# Patient Record
Sex: MALE | Race: BLACK OR AFRICAN AMERICAN
[De-identification: names, ages, dates, MRNs, and addresses within clinical notes are randomized per-mention and may not be internally consistent; named-entity substitution may affect disease eponyms.]

---

## 2019-02-28 ENCOUNTER — HOSPITAL ENCOUNTER (INPATIENT)
Dept: HOSPITAL 35 - ER | Age: 84
LOS: 4 days | Discharge: HOME | DRG: 963 | End: 2019-03-04
Attending: FAMILY MEDICINE | Admitting: FAMILY MEDICINE
Payer: COMMERCIAL

## 2019-02-28 VITALS — SYSTOLIC BLOOD PRESSURE: 117 MMHG | DIASTOLIC BLOOD PRESSURE: 65 MMHG

## 2019-02-28 VITALS — SYSTOLIC BLOOD PRESSURE: 126 MMHG | DIASTOLIC BLOOD PRESSURE: 60 MMHG

## 2019-02-28 VITALS — DIASTOLIC BLOOD PRESSURE: 72 MMHG | SYSTOLIC BLOOD PRESSURE: 135 MMHG

## 2019-02-28 VITALS — SYSTOLIC BLOOD PRESSURE: 120 MMHG | DIASTOLIC BLOOD PRESSURE: 65 MMHG

## 2019-02-28 VITALS — SYSTOLIC BLOOD PRESSURE: 125 MMHG | DIASTOLIC BLOOD PRESSURE: 71 MMHG

## 2019-02-28 VITALS — SYSTOLIC BLOOD PRESSURE: 125 MMHG | DIASTOLIC BLOOD PRESSURE: 81 MMHG

## 2019-02-28 VITALS — DIASTOLIC BLOOD PRESSURE: 66 MMHG | SYSTOLIC BLOOD PRESSURE: 135 MMHG

## 2019-02-28 VITALS — DIASTOLIC BLOOD PRESSURE: 64 MMHG | SYSTOLIC BLOOD PRESSURE: 128 MMHG

## 2019-02-28 VITALS — SYSTOLIC BLOOD PRESSURE: 123 MMHG | DIASTOLIC BLOOD PRESSURE: 61 MMHG

## 2019-02-28 VITALS — DIASTOLIC BLOOD PRESSURE: 69 MMHG | SYSTOLIC BLOOD PRESSURE: 144 MMHG

## 2019-02-28 VITALS — DIASTOLIC BLOOD PRESSURE: 63 MMHG | SYSTOLIC BLOOD PRESSURE: 121 MMHG

## 2019-02-28 VITALS — SYSTOLIC BLOOD PRESSURE: 135 MMHG | DIASTOLIC BLOOD PRESSURE: 64 MMHG

## 2019-02-28 VITALS — SYSTOLIC BLOOD PRESSURE: 116 MMHG | DIASTOLIC BLOOD PRESSURE: 63 MMHG

## 2019-02-28 VITALS — SYSTOLIC BLOOD PRESSURE: 128 MMHG | DIASTOLIC BLOOD PRESSURE: 69 MMHG

## 2019-02-28 VITALS — SYSTOLIC BLOOD PRESSURE: 126 MMHG | DIASTOLIC BLOOD PRESSURE: 65 MMHG

## 2019-02-28 VITALS — DIASTOLIC BLOOD PRESSURE: 80 MMHG | SYSTOLIC BLOOD PRESSURE: 116 MMHG

## 2019-02-28 VITALS — SYSTOLIC BLOOD PRESSURE: 129 MMHG | DIASTOLIC BLOOD PRESSURE: 55 MMHG

## 2019-02-28 VITALS — SYSTOLIC BLOOD PRESSURE: 139 MMHG | DIASTOLIC BLOOD PRESSURE: 73 MMHG

## 2019-02-28 VITALS — DIASTOLIC BLOOD PRESSURE: 71 MMHG | SYSTOLIC BLOOD PRESSURE: 139 MMHG

## 2019-02-28 VITALS — SYSTOLIC BLOOD PRESSURE: 135 MMHG | DIASTOLIC BLOOD PRESSURE: 72 MMHG

## 2019-02-28 VITALS — DIASTOLIC BLOOD PRESSURE: 69 MMHG | SYSTOLIC BLOOD PRESSURE: 123 MMHG

## 2019-02-28 VITALS — DIASTOLIC BLOOD PRESSURE: 72 MMHG | SYSTOLIC BLOOD PRESSURE: 145 MMHG

## 2019-02-28 VITALS — DIASTOLIC BLOOD PRESSURE: 71 MMHG | SYSTOLIC BLOOD PRESSURE: 141 MMHG

## 2019-02-28 VITALS — HEIGHT: 70 IN | BODY MASS INDEX: 29.06 KG/M2 | WEIGHT: 203 LBS

## 2019-02-28 VITALS — DIASTOLIC BLOOD PRESSURE: 69 MMHG | SYSTOLIC BLOOD PRESSURE: 143 MMHG

## 2019-02-28 VITALS — SYSTOLIC BLOOD PRESSURE: 138 MMHG | DIASTOLIC BLOOD PRESSURE: 71 MMHG

## 2019-02-28 VITALS — SYSTOLIC BLOOD PRESSURE: 132 MMHG | DIASTOLIC BLOOD PRESSURE: 65 MMHG

## 2019-02-28 DIAGNOSIS — K65.2: ICD-10-CM

## 2019-02-28 DIAGNOSIS — S37.012A: Primary | ICD-10-CM
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
## 2019-02-28 DIAGNOSIS — Z86.711: ICD-10-CM

## 2019-02-28 DIAGNOSIS — M10.9: ICD-10-CM

## 2019-02-28 DIAGNOSIS — Z91.012: ICD-10-CM

## 2019-02-28 DIAGNOSIS — M19.90: ICD-10-CM

## 2019-02-28 DIAGNOSIS — N17.9: ICD-10-CM

## 2019-02-28 DIAGNOSIS — N40.0: ICD-10-CM

## 2019-02-28 DIAGNOSIS — E78.00: ICD-10-CM

## 2019-02-28 DIAGNOSIS — Z87.81: ICD-10-CM

## 2019-02-28 DIAGNOSIS — N18.9: ICD-10-CM
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
## 2019-02-28 DIAGNOSIS — D68.9: ICD-10-CM

## 2019-02-28 DIAGNOSIS — I48.0: ICD-10-CM

## 2019-02-28 DIAGNOSIS — K21.9: ICD-10-CM

## 2019-02-28 DIAGNOSIS — E03.9: ICD-10-CM

## 2019-02-28 DIAGNOSIS — D47.2: ICD-10-CM

## 2019-02-28 DIAGNOSIS — Z79.899: ICD-10-CM

## 2019-02-28 DIAGNOSIS — Z87.828: ICD-10-CM

## 2019-02-28 DIAGNOSIS — Z91.041: ICD-10-CM

## 2019-02-28 DIAGNOSIS — Z83.3: ICD-10-CM

## 2019-02-28 DIAGNOSIS — R18.8: ICD-10-CM

## 2019-02-28 DIAGNOSIS — Z82.49: ICD-10-CM

## 2019-02-28 DIAGNOSIS — S37.011A: ICD-10-CM
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
## 2019-02-28 DIAGNOSIS — S36.898A: ICD-10-CM

## 2019-02-28 DIAGNOSIS — E66.9: ICD-10-CM

## 2019-02-28 DIAGNOSIS — Z79.01: ICD-10-CM

## 2019-02-28 DIAGNOSIS — D64.9: ICD-10-CM

## 2019-02-28 DIAGNOSIS — K80.20: ICD-10-CM

## 2019-02-28 DIAGNOSIS — E11.22: ICD-10-CM
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
## 2019-02-28 DIAGNOSIS — I12.9: ICD-10-CM

## 2019-02-28 DIAGNOSIS — Z79.84: ICD-10-CM

## 2019-02-28 DIAGNOSIS — N28.1: ICD-10-CM

## 2019-02-28 LAB
ALBUMIN SERPL-MCNC: 2.3 G/DL (ref 3.4–5)
ALT SERPL-CCNC: 64 U/L (ref 30–65)
ANION GAP SERPL CALC-SCNC: 8 MMOL/L (ref 7–16)
ANISOCYTOSIS BLD QL SMEAR: (no result)
APTT BLD: 64.7 SECONDS (ref 24.5–32.8)
AST SERPL-CCNC: 337 U/L (ref 15–37)
BACTERIA-REFLEX: (no result) /HPF
BASOPHILS NFR BLD AUTO: 1 % (ref 0–2)
BILIRUB SERPL-MCNC: 0.2 MG/DL
BILIRUB UR-MCNC: NEGATIVE MG/DL
BUN SERPL-MCNC: 31 MG/DL (ref 7–18)
CALCIUM SERPL-MCNC: 8.8 MG/DL (ref 8.5–10.1)
CHLORIDE SERPL-SCNC: 107 MMOL/L (ref 98–107)
CO2 SERPL-SCNC: 22 MMOL/L (ref 21–32)
COLOR UR: YELLOW
CREAT SERPL-MCNC: 2.5 MG/DL (ref 0.7–1.3)
EOSINOPHIL NFR BLD: 3 % (ref 0–3)
ERYTHROCYTE [DISTWIDTH] IN BLOOD BY AUTOMATED COUNT: 16.2 % (ref 10.5–14.5)
GLUCOSE SERPL-MCNC: 70 MG/DL (ref 74–106)
GRANULOCYTES NFR BLD MANUAL: 52 % (ref 36–66)
HCT VFR BLD CALC: 29.1 % (ref 42–52)
HGB BLD-MCNC: 9.7 GM/DL (ref 14–18)
INR PPP: 14.9
KETONES UR STRIP-MCNC: NEGATIVE MG/DL
LYMPHOCYTES NFR BLD AUTO: 29 % (ref 24–44)
MCH RBC QN AUTO: 27.9 PG (ref 26–34)
MCHC RBC AUTO-ENTMCNC: 33.4 G/DL (ref 28–37)
MCV RBC: 83.6 FL (ref 80–100)
MONOCYTES NFR BLD: 15 % (ref 1–8)
NEUTROPHILS # BLD: 2.8 THOU/UL (ref 1.4–8.2)
PLATELET # BLD EST: NORMAL 10*3/UL
PLATELET # BLD: 301 THOU/UL (ref 150–400)
POTASSIUM SERPL-SCNC: 4.3 MMOL/L (ref 3.5–5.1)
PROT SERPL-MCNC: 8.7 G/DL (ref 6.4–8.2)
PROTHROMBIN TIME: 151.9 SECONDS (ref 9.3–11.4)
RBC # BLD AUTO: 3.48 MIL/UL (ref 4.5–6)
RBC # UR STRIP: (no result) /UL
RBC #/AREA URNS HPF: (no result) /HPF (ref 0–2)
SODIUM SERPL-SCNC: 137 MMOL/L (ref 136–145)
SP GR UR STRIP: >= 1.03 (ref 1–1.03)
SQUAMOUS: (no result) /LPF (ref 0–3)
TROPONIN I SERPL-MCNC: <0.06 NG/ML (ref ?–0.06)
URINE CLARITY: CLEAR
URINE GLUCOSE-RANDOM*: NEGATIVE
URINE LEUKOCYTES-REFLEX: NEGATIVE
URINE NITRITE-REFLEX: NEGATIVE
URINE PROTEIN (DIPSTICK): (no result)
UROBILINOGEN UR STRIP-ACNC: 0.2 E.U./DL (ref 0.2–1)
WBC # BLD AUTO: 5.3 THOU/UL (ref 4–11)

## 2019-02-28 PROCEDURE — 10078: CPT

## 2019-02-28 PROCEDURE — 30233K1 TRANSFUSION OF NONAUTOLOGOUS FROZEN PLASMA INTO PERIPHERAL VEIN, PERCUTANEOUS APPROACH: ICD-10-PCS | Performed by: FAMILY MEDICINE

## 2019-02-28 PROCEDURE — 10045: CPT

## 2019-02-28 NOTE — NUR
ADMITTED TO ICU AT 1805, ALERT AND ORIENTED AND VITALS STABLE. ADMISSION
HISTORY AND ASSESSMENT COMPLETED. FFP TRANSFUSION INITIATED. BLOOD GLUCOSE
NOTED TO BE LOW, JUICE AND CRACKERS PROVIDED AND MD NOTIFIED. DIET ORDERS AND
HYPOGLYCEMIA PROTOCOL STARTED. BLOOD SUGAR LOW AFTER RE-CHECK, D50
ADMINISTERED AND SANDWICH PROVIDED. DIEABETIC MEDS HELD. WILL CONTINUE TO
MONITOR. REPORTED OFF TO NIGHT SHIFT RANDALL SILVER

## 2019-03-01 VITALS — SYSTOLIC BLOOD PRESSURE: 117 MMHG | DIASTOLIC BLOOD PRESSURE: 57 MMHG

## 2019-03-01 VITALS — SYSTOLIC BLOOD PRESSURE: 127 MMHG | DIASTOLIC BLOOD PRESSURE: 61 MMHG

## 2019-03-01 VITALS — SYSTOLIC BLOOD PRESSURE: 128 MMHG | DIASTOLIC BLOOD PRESSURE: 61 MMHG

## 2019-03-01 VITALS — SYSTOLIC BLOOD PRESSURE: 133 MMHG | DIASTOLIC BLOOD PRESSURE: 66 MMHG

## 2019-03-01 VITALS — DIASTOLIC BLOOD PRESSURE: 62 MMHG | SYSTOLIC BLOOD PRESSURE: 124 MMHG

## 2019-03-01 VITALS — DIASTOLIC BLOOD PRESSURE: 59 MMHG | SYSTOLIC BLOOD PRESSURE: 114 MMHG

## 2019-03-01 VITALS — SYSTOLIC BLOOD PRESSURE: 124 MMHG | DIASTOLIC BLOOD PRESSURE: 65 MMHG

## 2019-03-01 VITALS — DIASTOLIC BLOOD PRESSURE: 66 MMHG | SYSTOLIC BLOOD PRESSURE: 126 MMHG

## 2019-03-01 VITALS — DIASTOLIC BLOOD PRESSURE: 71 MMHG | SYSTOLIC BLOOD PRESSURE: 126 MMHG

## 2019-03-01 VITALS — DIASTOLIC BLOOD PRESSURE: 65 MMHG | SYSTOLIC BLOOD PRESSURE: 126 MMHG

## 2019-03-01 VITALS — DIASTOLIC BLOOD PRESSURE: 60 MMHG | SYSTOLIC BLOOD PRESSURE: 113 MMHG

## 2019-03-01 VITALS — SYSTOLIC BLOOD PRESSURE: 128 MMHG | DIASTOLIC BLOOD PRESSURE: 67 MMHG

## 2019-03-01 VITALS — SYSTOLIC BLOOD PRESSURE: 111 MMHG | DIASTOLIC BLOOD PRESSURE: 61 MMHG

## 2019-03-01 VITALS — SYSTOLIC BLOOD PRESSURE: 139 MMHG | DIASTOLIC BLOOD PRESSURE: 59 MMHG

## 2019-03-01 VITALS — DIASTOLIC BLOOD PRESSURE: 70 MMHG | SYSTOLIC BLOOD PRESSURE: 141 MMHG

## 2019-03-01 VITALS — DIASTOLIC BLOOD PRESSURE: 69 MMHG | SYSTOLIC BLOOD PRESSURE: 134 MMHG

## 2019-03-01 VITALS — DIASTOLIC BLOOD PRESSURE: 38 MMHG | SYSTOLIC BLOOD PRESSURE: 105 MMHG

## 2019-03-01 VITALS — DIASTOLIC BLOOD PRESSURE: 65 MMHG | SYSTOLIC BLOOD PRESSURE: 134 MMHG

## 2019-03-01 VITALS — SYSTOLIC BLOOD PRESSURE: 117 MMHG | DIASTOLIC BLOOD PRESSURE: 62 MMHG

## 2019-03-01 VITALS — DIASTOLIC BLOOD PRESSURE: 65 MMHG | SYSTOLIC BLOOD PRESSURE: 137 MMHG

## 2019-03-01 VITALS — DIASTOLIC BLOOD PRESSURE: 58 MMHG | SYSTOLIC BLOOD PRESSURE: 108 MMHG

## 2019-03-01 VITALS — SYSTOLIC BLOOD PRESSURE: 133 MMHG | DIASTOLIC BLOOD PRESSURE: 64 MMHG

## 2019-03-01 VITALS — DIASTOLIC BLOOD PRESSURE: 43 MMHG | SYSTOLIC BLOOD PRESSURE: 109 MMHG

## 2019-03-01 VITALS — DIASTOLIC BLOOD PRESSURE: 76 MMHG | SYSTOLIC BLOOD PRESSURE: 133 MMHG

## 2019-03-01 VITALS — SYSTOLIC BLOOD PRESSURE: 134 MMHG | DIASTOLIC BLOOD PRESSURE: 67 MMHG

## 2019-03-01 VITALS — DIASTOLIC BLOOD PRESSURE: 67 MMHG | SYSTOLIC BLOOD PRESSURE: 132 MMHG

## 2019-03-01 VITALS — SYSTOLIC BLOOD PRESSURE: 132 MMHG | DIASTOLIC BLOOD PRESSURE: 56 MMHG

## 2019-03-01 VITALS — DIASTOLIC BLOOD PRESSURE: 69 MMHG | SYSTOLIC BLOOD PRESSURE: 139 MMHG

## 2019-03-01 VITALS — SYSTOLIC BLOOD PRESSURE: 136 MMHG | DIASTOLIC BLOOD PRESSURE: 69 MMHG

## 2019-03-01 VITALS — SYSTOLIC BLOOD PRESSURE: 119 MMHG | DIASTOLIC BLOOD PRESSURE: 58 MMHG

## 2019-03-01 VITALS — SYSTOLIC BLOOD PRESSURE: 121 MMHG | DIASTOLIC BLOOD PRESSURE: 63 MMHG

## 2019-03-01 VITALS — DIASTOLIC BLOOD PRESSURE: 70 MMHG | SYSTOLIC BLOOD PRESSURE: 124 MMHG

## 2019-03-01 VITALS — DIASTOLIC BLOOD PRESSURE: 66 MMHG | SYSTOLIC BLOOD PRESSURE: 124 MMHG

## 2019-03-01 VITALS — DIASTOLIC BLOOD PRESSURE: 58 MMHG | SYSTOLIC BLOOD PRESSURE: 122 MMHG

## 2019-03-01 VITALS — DIASTOLIC BLOOD PRESSURE: 65 MMHG | SYSTOLIC BLOOD PRESSURE: 132 MMHG

## 2019-03-01 VITALS — SYSTOLIC BLOOD PRESSURE: 146 MMHG | DIASTOLIC BLOOD PRESSURE: 79 MMHG

## 2019-03-01 VITALS — SYSTOLIC BLOOD PRESSURE: 119 MMHG | DIASTOLIC BLOOD PRESSURE: 63 MMHG

## 2019-03-01 VITALS — SYSTOLIC BLOOD PRESSURE: 117 MMHG | DIASTOLIC BLOOD PRESSURE: 93 MMHG

## 2019-03-01 VITALS — DIASTOLIC BLOOD PRESSURE: 66 MMHG | SYSTOLIC BLOOD PRESSURE: 140 MMHG

## 2019-03-01 VITALS — DIASTOLIC BLOOD PRESSURE: 76 MMHG | SYSTOLIC BLOOD PRESSURE: 128 MMHG

## 2019-03-01 VITALS — DIASTOLIC BLOOD PRESSURE: 60 MMHG | SYSTOLIC BLOOD PRESSURE: 120 MMHG

## 2019-03-01 VITALS — DIASTOLIC BLOOD PRESSURE: 57 MMHG | SYSTOLIC BLOOD PRESSURE: 124 MMHG

## 2019-03-01 VITALS — SYSTOLIC BLOOD PRESSURE: 101 MMHG | DIASTOLIC BLOOD PRESSURE: 62 MMHG

## 2019-03-01 VITALS — DIASTOLIC BLOOD PRESSURE: 62 MMHG | SYSTOLIC BLOOD PRESSURE: 115 MMHG

## 2019-03-01 VITALS — DIASTOLIC BLOOD PRESSURE: 69 MMHG | SYSTOLIC BLOOD PRESSURE: 133 MMHG

## 2019-03-01 VITALS — DIASTOLIC BLOOD PRESSURE: 63 MMHG | SYSTOLIC BLOOD PRESSURE: 131 MMHG

## 2019-03-01 VITALS — DIASTOLIC BLOOD PRESSURE: 57 MMHG | SYSTOLIC BLOOD PRESSURE: 117 MMHG

## 2019-03-01 VITALS — SYSTOLIC BLOOD PRESSURE: 108 MMHG | DIASTOLIC BLOOD PRESSURE: 54 MMHG

## 2019-03-01 VITALS — SYSTOLIC BLOOD PRESSURE: 103 MMHG | DIASTOLIC BLOOD PRESSURE: 59 MMHG

## 2019-03-01 VITALS — DIASTOLIC BLOOD PRESSURE: 62 MMHG | SYSTOLIC BLOOD PRESSURE: 121 MMHG

## 2019-03-01 VITALS — DIASTOLIC BLOOD PRESSURE: 71 MMHG | SYSTOLIC BLOOD PRESSURE: 142 MMHG

## 2019-03-01 VITALS — DIASTOLIC BLOOD PRESSURE: 61 MMHG | SYSTOLIC BLOOD PRESSURE: 123 MMHG

## 2019-03-01 VITALS — SYSTOLIC BLOOD PRESSURE: 129 MMHG | DIASTOLIC BLOOD PRESSURE: 62 MMHG

## 2019-03-01 VITALS — DIASTOLIC BLOOD PRESSURE: 67 MMHG | SYSTOLIC BLOOD PRESSURE: 130 MMHG

## 2019-03-01 VITALS — SYSTOLIC BLOOD PRESSURE: 144 MMHG | DIASTOLIC BLOOD PRESSURE: 70 MMHG

## 2019-03-01 VITALS — SYSTOLIC BLOOD PRESSURE: 126 MMHG | DIASTOLIC BLOOD PRESSURE: 62 MMHG

## 2019-03-01 VITALS — SYSTOLIC BLOOD PRESSURE: 119 MMHG | DIASTOLIC BLOOD PRESSURE: 62 MMHG

## 2019-03-01 VITALS — SYSTOLIC BLOOD PRESSURE: 133 MMHG | DIASTOLIC BLOOD PRESSURE: 68 MMHG

## 2019-03-01 VITALS — DIASTOLIC BLOOD PRESSURE: 63 MMHG | SYSTOLIC BLOOD PRESSURE: 111 MMHG

## 2019-03-01 VITALS — DIASTOLIC BLOOD PRESSURE: 58 MMHG | SYSTOLIC BLOOD PRESSURE: 111 MMHG

## 2019-03-01 VITALS — SYSTOLIC BLOOD PRESSURE: 114 MMHG | DIASTOLIC BLOOD PRESSURE: 52 MMHG

## 2019-03-01 VITALS — DIASTOLIC BLOOD PRESSURE: 63 MMHG | SYSTOLIC BLOOD PRESSURE: 124 MMHG

## 2019-03-01 VITALS — SYSTOLIC BLOOD PRESSURE: 112 MMHG | DIASTOLIC BLOOD PRESSURE: 74 MMHG

## 2019-03-01 VITALS — DIASTOLIC BLOOD PRESSURE: 68 MMHG | SYSTOLIC BLOOD PRESSURE: 140 MMHG

## 2019-03-01 VITALS — SYSTOLIC BLOOD PRESSURE: 116 MMHG | DIASTOLIC BLOOD PRESSURE: 58 MMHG

## 2019-03-01 VITALS — SYSTOLIC BLOOD PRESSURE: 123 MMHG | DIASTOLIC BLOOD PRESSURE: 61 MMHG

## 2019-03-01 VITALS — SYSTOLIC BLOOD PRESSURE: 135 MMHG | DIASTOLIC BLOOD PRESSURE: 72 MMHG

## 2019-03-01 VITALS — SYSTOLIC BLOOD PRESSURE: 139 MMHG | DIASTOLIC BLOOD PRESSURE: 69 MMHG

## 2019-03-01 VITALS — SYSTOLIC BLOOD PRESSURE: 131 MMHG | DIASTOLIC BLOOD PRESSURE: 77 MMHG

## 2019-03-01 VITALS — SYSTOLIC BLOOD PRESSURE: 125 MMHG | DIASTOLIC BLOOD PRESSURE: 61 MMHG

## 2019-03-01 VITALS — SYSTOLIC BLOOD PRESSURE: 135 MMHG | DIASTOLIC BLOOD PRESSURE: 63 MMHG

## 2019-03-01 VITALS — DIASTOLIC BLOOD PRESSURE: 72 MMHG | SYSTOLIC BLOOD PRESSURE: 132 MMHG

## 2019-03-01 VITALS — SYSTOLIC BLOOD PRESSURE: 116 MMHG | DIASTOLIC BLOOD PRESSURE: 64 MMHG

## 2019-03-01 VITALS — SYSTOLIC BLOOD PRESSURE: 122 MMHG | DIASTOLIC BLOOD PRESSURE: 60 MMHG

## 2019-03-01 VITALS — SYSTOLIC BLOOD PRESSURE: 129 MMHG | DIASTOLIC BLOOD PRESSURE: 66 MMHG

## 2019-03-01 VITALS — DIASTOLIC BLOOD PRESSURE: 72 MMHG | SYSTOLIC BLOOD PRESSURE: 127 MMHG

## 2019-03-01 VITALS — SYSTOLIC BLOOD PRESSURE: 121 MMHG | DIASTOLIC BLOOD PRESSURE: 59 MMHG

## 2019-03-01 VITALS — DIASTOLIC BLOOD PRESSURE: 66 MMHG | SYSTOLIC BLOOD PRESSURE: 127 MMHG

## 2019-03-01 VITALS — SYSTOLIC BLOOD PRESSURE: 128 MMHG | DIASTOLIC BLOOD PRESSURE: 64 MMHG

## 2019-03-01 VITALS — SYSTOLIC BLOOD PRESSURE: 141 MMHG | DIASTOLIC BLOOD PRESSURE: 64 MMHG

## 2019-03-01 VITALS — DIASTOLIC BLOOD PRESSURE: 60 MMHG | SYSTOLIC BLOOD PRESSURE: 121 MMHG

## 2019-03-01 LAB
ALBUMIN SERPL-MCNC: 2.3 G/DL (ref 3.4–5)
ALT SERPL-CCNC: 58 U/L (ref 30–65)
ANION GAP SERPL CALC-SCNC: 12 MMOL/L (ref 7–16)
AST SERPL-CCNC: 251 U/L (ref 15–37)
BF NUCLEATED CELLS: 7480
BF RBC: 3266
BILIRUB DIRECT SERPL-MCNC: 0.1 MG/DL
BILIRUB SERPL-MCNC: 0.3 MG/DL
BUN SERPL-MCNC: 28 MG/DL (ref 7–18)
CALCIUM SERPL-MCNC: 8.5 MG/DL (ref 8.5–10.1)
CHLORIDE SERPL-SCNC: 108 MMOL/L (ref 98–107)
CLARITY UR: (no result)
CO2 SERPL-SCNC: 21 MMOL/L (ref 21–32)
COLOR UR: YELLOW
CREAT SERPL-MCNC: 2.1 MG/DL (ref 0.7–1.3)
ERYTHROCYTE [DISTWIDTH] IN BLOOD BY AUTOMATED COUNT: 16.5 % (ref 10.5–14.5)
GLUCOSE SERPL-MCNC: 65 MG/DL (ref 74–106)
HCT VFR BLD CALC: 26.8 % (ref 42–52)
HCT VFR BLD CALC: 30.4 % (ref 42–52)
HGB BLD-MCNC: 8.6 GM/DL (ref 14–18)
HGB BLD-MCNC: 9.8 GM/DL (ref 14–18)
INR PPP: 1.4
IRON SERPL-MCNC: 44 UG/DL (ref 65–175)
MCH RBC QN AUTO: 27 PG (ref 26–34)
MCHC RBC AUTO-ENTMCNC: 32.2 G/DL (ref 28–37)
MCV RBC: 83.9 FL (ref 80–100)
PLATELET # BLD: 287 THOU/UL (ref 150–400)
POTASSIUM SERPL-SCNC: 4.2 MMOL/L (ref 3.5–5.1)
PROT SERPL-MCNC: 8.5 G/DL (ref 6.4–8.2)
PROT/CREAT UR-RTO: 0.4
PROTHROMBIN TIME: 15.1 SECONDS (ref 9.3–11.4)
RBC # BLD AUTO: 3.2 MIL/UL (ref 4.5–6)
SAO2 % BLD FROM PO2: 26 % (ref 20–39)
SODIUM SERPL-SCNC: 141 MMOL/L (ref 136–145)
SOURCE: (no result)
SPECIMEN VOL 24H UR: 60 ML
TIBC SERPL-MCNC: 172 UG/DL (ref 250–450)
URINE CREATININE-RANDOM*: 105.5 MG/DL
URINE PROTEIN-RANDOM*: 47.2 MG/DL (ref ?–11.9)
WBC # BLD AUTO: 4.2 THOU/UL (ref 4–11)

## 2019-03-01 PROCEDURE — 0W9G3ZZ DRAINAGE OF PERITONEAL CAVITY, PERCUTANEOUS APPROACH: ICD-10-PCS | Performed by: RADIOLOGY

## 2019-03-01 NOTE — NUR
CM ASSESSMENT:
CASE OPENED FOR DC PLANNING. CLINICAL INFO REVIEWED. ADMITTED THRU ER WITH
ELEVATED INR AND RETROPERITONEAL BLEED. PT AND SPUOUSE LIVE TOGETHER IN HOUSE.
PT INDEPENDENT WITH ADLS, WORKS PT, DRIVES. SHARES IADLS WITH SPOUSE. NO
PREVIOUS HH. SUPPORTIVE FAMILY. PCP IS SAMANTHA LR. NO WEEKEND DC PLANNED.

## 2019-03-01 NOTE — NUR
ASSUMED CARE OF PT AT 0700, MAIN COMPLAINT
OF TIGHNESS AND FULLNESS IN ABDOMEN, A&OX4, CALM AND COOPERATIVE THROUGHOUT
DAY. NO PAIN STATED THROUGHOUT DAY.
TACHYCARDIC FOR MOST OF DAY, PT REMAINS ON ROOM AIR. HYPOACTIVE
BOWEL SOUNDS, CT SCAN OF ABDOMEN AND PARACENTESIS PERFORMED, 1.1 L OF FLUID
DRAINED, TOLERATED PROCEDURE, PRESSURE DRESSING IN PLACE. BLOOD SUGAR REMAINED
WITHIN NORML LIMITS THROUGHOUT DAY. PT. IS IN BED RESTING WITH CALL LIGHT
WITHIN REACH, WILL CONTINUE TO MONITOR.

## 2019-03-02 VITALS — DIASTOLIC BLOOD PRESSURE: 64 MMHG | SYSTOLIC BLOOD PRESSURE: 109 MMHG

## 2019-03-02 VITALS — DIASTOLIC BLOOD PRESSURE: 53 MMHG | SYSTOLIC BLOOD PRESSURE: 113 MMHG

## 2019-03-02 VITALS — SYSTOLIC BLOOD PRESSURE: 122 MMHG | DIASTOLIC BLOOD PRESSURE: 65 MMHG

## 2019-03-02 VITALS — DIASTOLIC BLOOD PRESSURE: 59 MMHG | SYSTOLIC BLOOD PRESSURE: 112 MMHG

## 2019-03-02 VITALS — DIASTOLIC BLOOD PRESSURE: 63 MMHG | SYSTOLIC BLOOD PRESSURE: 131 MMHG

## 2019-03-02 VITALS — DIASTOLIC BLOOD PRESSURE: 63 MMHG | SYSTOLIC BLOOD PRESSURE: 123 MMHG

## 2019-03-02 VITALS — DIASTOLIC BLOOD PRESSURE: 63 MMHG | SYSTOLIC BLOOD PRESSURE: 120 MMHG

## 2019-03-02 VITALS — SYSTOLIC BLOOD PRESSURE: 112 MMHG | DIASTOLIC BLOOD PRESSURE: 64 MMHG

## 2019-03-02 VITALS — SYSTOLIC BLOOD PRESSURE: 128 MMHG | DIASTOLIC BLOOD PRESSURE: 74 MMHG

## 2019-03-02 VITALS — SYSTOLIC BLOOD PRESSURE: 123 MMHG | DIASTOLIC BLOOD PRESSURE: 64 MMHG

## 2019-03-02 VITALS — SYSTOLIC BLOOD PRESSURE: 134 MMHG | DIASTOLIC BLOOD PRESSURE: 66 MMHG

## 2019-03-02 VITALS — SYSTOLIC BLOOD PRESSURE: 127 MMHG | DIASTOLIC BLOOD PRESSURE: 59 MMHG

## 2019-03-02 VITALS — DIASTOLIC BLOOD PRESSURE: 58 MMHG | SYSTOLIC BLOOD PRESSURE: 114 MMHG

## 2019-03-02 VITALS — DIASTOLIC BLOOD PRESSURE: 64 MMHG | SYSTOLIC BLOOD PRESSURE: 115 MMHG

## 2019-03-02 VITALS — SYSTOLIC BLOOD PRESSURE: 123 MMHG | DIASTOLIC BLOOD PRESSURE: 62 MMHG

## 2019-03-02 VITALS — DIASTOLIC BLOOD PRESSURE: 66 MMHG | SYSTOLIC BLOOD PRESSURE: 124 MMHG

## 2019-03-02 VITALS — DIASTOLIC BLOOD PRESSURE: 57 MMHG | SYSTOLIC BLOOD PRESSURE: 119 MMHG

## 2019-03-02 LAB
ALBUMIN SERPL-MCNC: 2 G/DL (ref 3.4–5)
ALT SERPL-CCNC: 28 U/L (ref 30–65)
ANION GAP SERPL CALC-SCNC: 11 MMOL/L (ref 7–16)
AST SERPL-CCNC: 218 U/L (ref 15–37)
BILIRUB DIRECT SERPL-MCNC: < 0.1 MG/DL
BILIRUB SERPL-MCNC: 0.3 MG/DL
BUN SERPL-MCNC: 24 MG/DL (ref 7–18)
CALCIUM SERPL-MCNC: 8.1 MG/DL (ref 8.5–10.1)
CHLORIDE SERPL-SCNC: 104 MMOL/L (ref 98–107)
CO2 SERPL-SCNC: 20 MMOL/L (ref 21–32)
CREAT SERPL-MCNC: 2.1 MG/DL (ref 0.7–1.3)
ERYTHROCYTE [DISTWIDTH] IN BLOOD BY AUTOMATED COUNT: 16.3 % (ref 10.5–14.5)
GLUCOSE SERPL-MCNC: 78 MG/DL (ref 74–106)
HCT VFR BLD CALC: 26.8 % (ref 42–52)
HCV AB SER IA-ACNC: 0.8 (ref 0–0.9)
HGB BLD-MCNC: 8.6 GM/DL (ref 14–18)
MAGNESIUM SERPL-MCNC: 1.8 MG/DL (ref 1.8–2.4)
MCH RBC QN AUTO: 27 PG (ref 26–34)
MCHC RBC AUTO-ENTMCNC: 32.3 G/DL (ref 28–37)
MCV RBC: 83.7 FL (ref 80–100)
PLATELET # BLD: 277 THOU/UL (ref 150–400)
POTASSIUM SERPL-SCNC: 4.3 MMOL/L (ref 3.5–5.1)
PROT SERPL-MCNC: 7.7 G/DL (ref 6.4–8.2)
RBC # BLD AUTO: 3.2 MIL/UL (ref 4.5–6)
SODIUM SERPL-SCNC: 135 MMOL/L (ref 136–145)
WBC # BLD AUTO: 4.6 THOU/UL (ref 4–11)

## 2019-03-02 NOTE — NUR
ASSUMED CARE OF PT AT 0700. PT DENIES N/V AND PAIN IN ABDOMEN. ABDOMEN STILL
ROUND, FIRM, AND DISTENDED. PT. WALKED WITH STAND BY ASSIST TO DOOR AND BACK
AND TOLERATED WELL. LABS FROM PARACENTESIS STILL PENDING. OK TO TRANSFER OUT
OF ICU. REPORT GIVEN TO RUBI BARLOW. PT. RING AND WATCH GIVEN TO WIFE AND SENT
HOME. PT. WILL TRANSFER TO ROOM 451.

## 2019-03-02 NOTE — NUR
Pt remains stable in this shift. Denies of any SOB or chest discomfort.
Report less tightness in his ABD this am. Dressing on Lt side of ABD remains
D/C/I. Making good UO. VSS. Awaiting for Lab result this am.

## 2019-03-03 VITALS — DIASTOLIC BLOOD PRESSURE: 66 MMHG | SYSTOLIC BLOOD PRESSURE: 136 MMHG

## 2019-03-03 VITALS — DIASTOLIC BLOOD PRESSURE: 79 MMHG | SYSTOLIC BLOOD PRESSURE: 144 MMHG

## 2019-03-03 VITALS — DIASTOLIC BLOOD PRESSURE: 60 MMHG | SYSTOLIC BLOOD PRESSURE: 122 MMHG

## 2019-03-03 VITALS — DIASTOLIC BLOOD PRESSURE: 66 MMHG | SYSTOLIC BLOOD PRESSURE: 127 MMHG

## 2019-03-03 LAB
ALBUMIN FLD-MCNC: 2.3 G/DL
ALBUMIN SERPL-MCNC: 2 G/DL (ref 3.4–5)
AMYLASE FLD-CCNC: 346 U/L
ANION GAP SERPL CALC-SCNC: 11 MMOL/L (ref 7–16)
BODY FLUID LDH: (no result) IU/L
BUN SERPL-MCNC: 26 MG/DL (ref 7–18)
CALCIUM SERPL-MCNC: 8.4 MG/DL (ref 8.5–10.1)
CERULOPLASMIN SERPL-MCNC: 24.7 MG/DL (ref 16–31)
CHLORIDE SERPL-SCNC: 104 MMOL/L (ref 98–107)
CO2 SERPL-SCNC: 20 MMOL/L (ref 21–32)
CREAT SERPL-MCNC: 2.1 MG/DL (ref 0.7–1.3)
ERYTHROCYTE [DISTWIDTH] IN BLOOD BY AUTOMATED COUNT: 16.2 % (ref 10.5–14.5)
GLUCOSE FLD-MCNC: 25 MG/DL
GLUCOSE SERPL-MCNC: 80 MG/DL (ref 74–106)
HCT VFR BLD CALC: 27.7 % (ref 42–52)
HGB BLD-MCNC: 9.3 GM/DL (ref 14–18)
MCH RBC QN AUTO: 28 PG (ref 26–34)
MCHC RBC AUTO-ENTMCNC: 33.4 G/DL (ref 28–37)
MCV RBC: 83.8 FL (ref 80–100)
PHOSPHATE SERPL-MCNC: 2.9 MG/DL (ref 2.5–4.9)
PLATELET # BLD: 282 THOU/UL (ref 150–400)
POTASSIUM SERPL-SCNC: 4.3 MMOL/L (ref 3.5–5.1)
PROT FLD-MCNC: 5.9 G/DL
RBC # BLD AUTO: 3.31 MIL/UL (ref 4.5–6)
SMOOTH MUSCLE ANTIBODY: 18 UNITS (ref 0–19)
SODIUM SERPL-SCNC: 135 MMOL/L (ref 136–145)
WBC # BLD AUTO: 5.9 THOU/UL (ref 4–11)

## 2019-03-03 NOTE — NUR
Pt. rested quietly during the night when checked on during frequent rounds.
His iv infiltrated in left upper arm.  Area is swollen and reddish in color.
Pt. does not want an iv restarted.  Will attempt again later this morning as
pt. has no iv meds at this time.  No c/o shortness of air.

## 2019-03-03 NOTE — NUR
PT STABLE THROUGHOUT SHIFT. OVERNIGHT PT'S IV INFILTRATED, LEFT ARM RED AND
SWOLLEN. PT REFUSED TO ALLOW IV IN RIGHT ARM. WRAPPED AND ELEVATED LEFT ARM
AND PT HAD NO COMPLAINTS OF PAIN. PT RESTING COMFORTABLY.

## 2019-03-04 VITALS — SYSTOLIC BLOOD PRESSURE: 120 MMHG | DIASTOLIC BLOOD PRESSURE: 63 MMHG

## 2019-03-04 VITALS — DIASTOLIC BLOOD PRESSURE: 50 MMHG | SYSTOLIC BLOOD PRESSURE: 146 MMHG

## 2019-03-04 VITALS — SYSTOLIC BLOOD PRESSURE: 137 MMHG | DIASTOLIC BLOOD PRESSURE: 59 MMHG

## 2019-03-04 LAB
ALBUMIN SERPL-MCNC: 2 G/DL (ref 3.4–5)
ANION GAP SERPL CALC-SCNC: 12 MMOL/L (ref 7–16)
BUN SERPL-MCNC: 31 MG/DL (ref 7–18)
CALCIUM SERPL-MCNC: 8 MG/DL (ref 8.5–10.1)
CHLORIDE SERPL-SCNC: 105 MMOL/L (ref 98–107)
CO2 SERPL-SCNC: 19 MMOL/L (ref 21–32)
CREAT SERPL-MCNC: 2.4 MG/DL (ref 0.7–1.3)
ERYTHROCYTE [DISTWIDTH] IN BLOOD BY AUTOMATED COUNT: 16.2 % (ref 10.5–14.5)
GLUCOSE SERPL-MCNC: 103 MG/DL (ref 74–106)
HCT VFR BLD CALC: 26.9 % (ref 42–52)
HGB BLD-MCNC: 9 GM/DL (ref 14–18)
KAPPA LC SERPL-MCNC: 1145.1 MG/L (ref 3.3–19.4)
KAPPA LC/LAMBDA SER: 14.07 {RATIO} (ref 0.26–1.65)
LAMBDA LC FREE SERPL-MCNC: 81.4 MG/L (ref 5.7–26.3)
MCH RBC QN AUTO: 28.3 PG (ref 26–34)
MCHC RBC AUTO-ENTMCNC: 33.6 G/DL (ref 28–37)
MCV RBC: 84.2 FL (ref 80–100)
PHOSPHATE SERPL-MCNC: 3.7 MG/DL (ref 2.5–4.9)
PLATELET # BLD: 283 THOU/UL (ref 150–400)
POTASSIUM SERPL-SCNC: 4.7 MMOL/L (ref 3.5–5.1)
RBC # BLD AUTO: 3.19 MIL/UL (ref 4.5–6)
SODIUM SERPL-SCNC: 136 MMOL/L (ref 136–145)
WBC # BLD AUTO: 5.3 THOU/UL (ref 4–11)

## 2019-03-04 NOTE — NUR
CARE TEAM INDICATED THAT PT IS MEDICALLY STABLE TO DISCHARGE HOME THIS DAY
WITH NO NEEDS. NO OTHER CM INTERVENTION INDICATED AT THIS TIME. CASE CLOSED.

## 2019-03-04 NOTE — NUR
Assumed care at 1845. Pt resting in bed on frequent rounds. Denies pain. Still
with no IV access. No identified needs at the moment. Call light within reach.
Will continue to monitor.

## 2019-04-14 ENCOUNTER — HOSPITAL ENCOUNTER (INPATIENT)
Dept: HOSPITAL 35 - ER | Age: 84
LOS: 9 days | Discharge: ANOTHER HOSPITAL | DRG: 871 | End: 2019-04-23
Attending: HOSPITALIST | Admitting: HOSPITALIST
Payer: COMMERCIAL

## 2019-04-14 VITALS — SYSTOLIC BLOOD PRESSURE: 115 MMHG | DIASTOLIC BLOOD PRESSURE: 68 MMHG

## 2019-04-14 VITALS — DIASTOLIC BLOOD PRESSURE: 78 MMHG | SYSTOLIC BLOOD PRESSURE: 148 MMHG

## 2019-04-14 VITALS — SYSTOLIC BLOOD PRESSURE: 160 MMHG | DIASTOLIC BLOOD PRESSURE: 82 MMHG

## 2019-04-14 VITALS — WEIGHT: 217.5 LBS | BODY MASS INDEX: 31.14 KG/M2 | HEIGHT: 70 IN

## 2019-04-14 VITALS — DIASTOLIC BLOOD PRESSURE: 78 MMHG | SYSTOLIC BLOOD PRESSURE: 126 MMHG

## 2019-04-14 VITALS — SYSTOLIC BLOOD PRESSURE: 138 MMHG | DIASTOLIC BLOOD PRESSURE: 73 MMHG

## 2019-04-14 VITALS — SYSTOLIC BLOOD PRESSURE: 113 MMHG | DIASTOLIC BLOOD PRESSURE: 65 MMHG

## 2019-04-14 DIAGNOSIS — N17.0: ICD-10-CM

## 2019-04-14 DIAGNOSIS — D63.8: ICD-10-CM

## 2019-04-14 DIAGNOSIS — E87.5: ICD-10-CM

## 2019-04-14 DIAGNOSIS — N28.1: ICD-10-CM

## 2019-04-14 DIAGNOSIS — E83.42: ICD-10-CM

## 2019-04-14 DIAGNOSIS — D47.2: ICD-10-CM

## 2019-04-14 DIAGNOSIS — E11.22: ICD-10-CM

## 2019-04-14 DIAGNOSIS — Z79.82: ICD-10-CM

## 2019-04-14 DIAGNOSIS — R13.10: ICD-10-CM

## 2019-04-14 DIAGNOSIS — Z91.012: ICD-10-CM

## 2019-04-14 DIAGNOSIS — E78.00: ICD-10-CM

## 2019-04-14 DIAGNOSIS — E43: ICD-10-CM

## 2019-04-14 DIAGNOSIS — Z91.041: ICD-10-CM

## 2019-04-14 DIAGNOSIS — C90.00: ICD-10-CM

## 2019-04-14 DIAGNOSIS — E53.8: ICD-10-CM

## 2019-04-14 DIAGNOSIS — J96.01: ICD-10-CM

## 2019-04-14 DIAGNOSIS — G47.33: ICD-10-CM

## 2019-04-14 DIAGNOSIS — E78.5: ICD-10-CM

## 2019-04-14 DIAGNOSIS — E11.40: ICD-10-CM

## 2019-04-14 DIAGNOSIS — E87.6: ICD-10-CM

## 2019-04-14 DIAGNOSIS — Z86.711: ICD-10-CM

## 2019-04-14 DIAGNOSIS — J98.59: ICD-10-CM

## 2019-04-14 DIAGNOSIS — D75.9: ICD-10-CM

## 2019-04-14 DIAGNOSIS — A41.9: Primary | ICD-10-CM

## 2019-04-14 DIAGNOSIS — Z87.891: ICD-10-CM

## 2019-04-14 DIAGNOSIS — M10.9: ICD-10-CM

## 2019-04-14 DIAGNOSIS — K21.9: ICD-10-CM

## 2019-04-14 DIAGNOSIS — Z79.899: ICD-10-CM

## 2019-04-14 DIAGNOSIS — I12.9: ICD-10-CM

## 2019-04-14 DIAGNOSIS — R18.8: ICD-10-CM

## 2019-04-14 DIAGNOSIS — N18.9: ICD-10-CM

## 2019-04-14 DIAGNOSIS — J18.9: ICD-10-CM

## 2019-04-14 DIAGNOSIS — E03.9: ICD-10-CM

## 2019-04-14 DIAGNOSIS — J90: ICD-10-CM

## 2019-04-14 DIAGNOSIS — N39.0: ICD-10-CM

## 2019-04-14 LAB
ALBUMIN SERPL-MCNC: 2.2 G/DL (ref 3.4–5)
ALT SERPL-CCNC: 24 U/L (ref 30–65)
ANION GAP SERPL CALC-SCNC: 15 MMOL/L (ref 7–16)
AST SERPL-CCNC: 89 U/L (ref 15–37)
BACTERIA-REFLEX: (no result) /HPF
BASOPHILS NFR BLD AUTO: 2 % (ref 0–2)
BILIRUB DIRECT SERPL-MCNC: 0.1 MG/DL
BILIRUB SERPL-MCNC: 0.3 MG/DL
BILIRUB UR-MCNC: NEGATIVE MG/DL
BUN SERPL-MCNC: 64 MG/DL (ref 7–18)
CALCIUM SERPL-MCNC: 9.1 MG/DL (ref 8.5–10.1)
CHLORIDE SERPL-SCNC: 105 MMOL/L (ref 98–107)
CO2 SERPL-SCNC: 16 MMOL/L (ref 21–32)
COLOR UR: YELLOW
CREAT SERPL-MCNC: 8.6 MG/DL (ref 0.7–1.3)
EOSINOPHIL NFR BLD: 1.8 % (ref 0–3)
ERYTHROCYTE [DISTWIDTH] IN BLOOD BY AUTOMATED COUNT: 17.5 % (ref 10.5–14.5)
GLUCOSE SERPL-MCNC: 93 MG/DL (ref 74–106)
GRANULOCYTES NFR BLD MANUAL: 62.1 % (ref 36–66)
HCT VFR BLD CALC: 26.5 % (ref 42–52)
HGB BLD-MCNC: 8.7 GM/DL (ref 14–18)
KETONES UR STRIP-MCNC: NEGATIVE MG/DL
LYMPHOCYTES NFR BLD AUTO: 22.7 % (ref 24–44)
MCH RBC QN AUTO: 27.5 PG (ref 26–34)
MCHC RBC AUTO-ENTMCNC: 32.7 G/DL (ref 28–37)
MCV RBC: 84.2 FL (ref 80–100)
MONOCYTES NFR BLD: 11.4 % (ref 1–8)
MUCUS: (no result) STRN/LPF
NEUTROPHILS # BLD: 4.5 THOU/UL (ref 1.4–8.2)
PLATELET # BLD: 296 THOU/UL (ref 150–400)
POTASSIUM SERPL-SCNC: 5.8 MMOL/L (ref 3.5–5.1)
PROT SERPL-MCNC: 9.8 G/DL (ref 6.4–8.2)
RBC # BLD AUTO: 3.15 MIL/UL (ref 4.5–6)
RBC # UR STRIP: (no result) /UL
RBC #/AREA URNS HPF: (no result) /HPF (ref 0–2)
SODIUM SERPL-SCNC: 136 MMOL/L (ref 136–145)
SP GR UR STRIP: 1.02 (ref 1–1.03)
SQUAMOUS: (no result) /LPF (ref 0–3)
TROPONIN I SERPL-MCNC: <0.06 NG/ML (ref ?–0.06)
URIC ACID CRYSTALS: (no result) /LPF
URIC ACID*: 15.4 MG/DL (ref 2.6–7.2)
URINE CLARITY: (no result)
URINE GLUCOSE-RANDOM*: NEGATIVE
URINE LEUKOCYTES-REFLEX: (no result)
URINE NITRITE-REFLEX: NEGATIVE
URINE PROTEIN (DIPSTICK): (no result)
URINE WBC-REFLEX: (no result) /HPF (ref 0–5)
UROBILINOGEN UR STRIP-ACNC: 0.2 E.U./DL (ref 0.2–1)
WBC # BLD AUTO: 7.3 THOU/UL (ref 4–11)

## 2019-04-14 PROCEDURE — 32100 EXPLORATION OF CHEST: CPT

## 2019-04-14 PROCEDURE — 10081 I&D PILONIDAL CYST COMP: CPT

## 2019-04-14 NOTE — NUR
PT CARE ASSUMED APPROX 1100. ADMITTED FROM ED FOR NEHA. PT DROWSY BUT
EASILY AROUSABLE. ORIENTED X4. DENIED PAIN UPON ARRIVAL BUT SOON AFTER C/O
LEFT FLANK PAIN. PAIN MANAGED WITH MORPHINE. DENIES SOA. WIFE AT BEDSIDE. IVF
STARTED. ALDRICH PATENT BUT NO OUTPUT. WILL NOTIFY RENAL DR TIMELY. VSS. PT AND
WIFE BOTH DENY QUESTIONS OR CONCERNS REGARDING POC. LUNGS SOUNDS ARE CLEAR
DESPITE NO OUTPUT. NO DISTRESS NOTED.

## 2019-04-15 VITALS — SYSTOLIC BLOOD PRESSURE: 139 MMHG | DIASTOLIC BLOOD PRESSURE: 70 MMHG

## 2019-04-15 VITALS — SYSTOLIC BLOOD PRESSURE: 121 MMHG | DIASTOLIC BLOOD PRESSURE: 59 MMHG

## 2019-04-15 VITALS — SYSTOLIC BLOOD PRESSURE: 140 MMHG | DIASTOLIC BLOOD PRESSURE: 83 MMHG

## 2019-04-15 VITALS — DIASTOLIC BLOOD PRESSURE: 77 MMHG | SYSTOLIC BLOOD PRESSURE: 146 MMHG

## 2019-04-15 VITALS — SYSTOLIC BLOOD PRESSURE: 130 MMHG | DIASTOLIC BLOOD PRESSURE: 74 MMHG

## 2019-04-15 VITALS — DIASTOLIC BLOOD PRESSURE: 77 MMHG | SYSTOLIC BLOOD PRESSURE: 121 MMHG

## 2019-04-15 LAB
ALBUMIN SERPL-MCNC: 2 G/DL (ref 3.4–5)
ANION GAP SERPL CALC-SCNC: 14 MMOL/L (ref 7–16)
BUN SERPL-MCNC: 66 MG/DL (ref 7–18)
CALCIUM SERPL-MCNC: 8.5 MG/DL (ref 8.5–10.1)
CHLORIDE SERPL-SCNC: 106 MMOL/L (ref 98–107)
CO2 SERPL-SCNC: 15 MMOL/L (ref 21–32)
CREAT SERPL-MCNC: 9.2 MG/DL (ref 0.7–1.3)
ERYTHROCYTE [DISTWIDTH] IN BLOOD BY AUTOMATED COUNT: 17.7 % (ref 10.5–14.5)
GLUCOSE SERPL-MCNC: 85 MG/DL (ref 74–106)
HCT VFR BLD CALC: 26 % (ref 42–52)
HGB BLD-MCNC: 8.5 GM/DL (ref 14–18)
MCH RBC QN AUTO: 27.5 PG (ref 26–34)
MCHC RBC AUTO-ENTMCNC: 32.7 G/DL (ref 28–37)
MCV RBC: 84.3 FL (ref 80–100)
PHOSPHATE SERPL-MCNC: 4.6 MG/DL (ref 2.5–4.9)
PLATELET # BLD: 280 THOU/UL (ref 150–400)
POTASSIUM SERPL-SCNC: 6.6 MMOL/L (ref 3.5–5.1)
RBC # BLD AUTO: 3.09 MIL/UL (ref 4.5–6)
SODIUM SERPL-SCNC: 135 MMOL/L (ref 136–145)
WBC # BLD AUTO: 6.4 THOU/UL (ref 4–11)

## 2019-04-15 NOTE — NUR
ASSUMED PT CARE AT 1900 WITH NO SIGN OF DISTRESS NOTED IN PT. PT IS STABLE
WITH FAMILY AT BEDSIDE. SCHEDULED MEDS ADMINISTERED TO PT. PT TOLERATED MED.
CRITICAL LAB VALUES REPORTED TO PHYSICIAN. VITAL SIGNS  STABLE. ASSESSMENT
DOCUMENTED. DENIES ANY FURTHER NEEDS AT THIS TIME.

## 2019-04-15 NOTE — NUR
PT ORIENTED BUT DROWSY. ABLE TO ANSWER QUESTIONS APPRORIATELY BUT DELAYED/SLOW
RESPONSES. RIGHT JUGULAR TEMP DIALYSIS CATH PLACED TODAY IN IR. HEMODYALSIS
AFTER PLACEMENT. NO FLUIDS TAKEN OFF. PLAN FOR DIALYSIS AGAIN TOMORROW (4/16)
AM. IV FLUIDS INFUSING  ML/HR. ORDERS PER DR. MIXON TO D/C VALDEMAR. REMAINS
ANURIC AT THIS TIME. FAMILY AT BEDSIDE THROUGH OUT THE DAY.

## 2019-04-16 VITALS — DIASTOLIC BLOOD PRESSURE: 87 MMHG | SYSTOLIC BLOOD PRESSURE: 151 MMHG

## 2019-04-16 VITALS — DIASTOLIC BLOOD PRESSURE: 94 MMHG | SYSTOLIC BLOOD PRESSURE: 166 MMHG

## 2019-04-16 VITALS — DIASTOLIC BLOOD PRESSURE: 100 MMHG | SYSTOLIC BLOOD PRESSURE: 167 MMHG

## 2019-04-16 VITALS — DIASTOLIC BLOOD PRESSURE: 103 MMHG | SYSTOLIC BLOOD PRESSURE: 192 MMHG

## 2019-04-16 VITALS — SYSTOLIC BLOOD PRESSURE: 164 MMHG | DIASTOLIC BLOOD PRESSURE: 81 MMHG

## 2019-04-16 VITALS — SYSTOLIC BLOOD PRESSURE: 117 MMHG | DIASTOLIC BLOOD PRESSURE: 89 MMHG

## 2019-04-16 LAB
ALBUMIN SERPL-MCNC: 1.9 G/DL (ref 3.4–5)
ANION GAP SERPL CALC-SCNC: 12 MMOL/L (ref 7–16)
BUN SERPL-MCNC: 33 MG/DL (ref 7–18)
CALCIUM SERPL-MCNC: 7.7 MG/DL (ref 8.5–10.1)
CHLORIDE SERPL-SCNC: 101 MMOL/L (ref 98–107)
CO2 SERPL-SCNC: 20 MMOL/L (ref 21–32)
CREAT SERPL-MCNC: 6 MG/DL (ref 0.7–1.3)
ERYTHROCYTE [DISTWIDTH] IN BLOOD BY AUTOMATED COUNT: 17.2 % (ref 10.5–14.5)
GLUCOSE SERPL-MCNC: 92 MG/DL (ref 74–106)
HCT VFR BLD CALC: 24.3 % (ref 42–52)
HGB BLD-MCNC: 8.1 GM/DL (ref 14–18)
INR PPP: 1.3
MCH RBC QN AUTO: 27.7 PG (ref 26–34)
MCHC RBC AUTO-ENTMCNC: 33.2 G/DL (ref 28–37)
MCV RBC: 83.4 FL (ref 80–100)
PHOSPHATE SERPL-MCNC: 3.2 MG/DL (ref 2.5–4.9)
PLATELET # BLD: 246 THOU/UL (ref 150–400)
POTASSIUM SERPL-SCNC: 4 MMOL/L (ref 3.5–5.1)
PROTHROMBIN TIME: 13.3 SECONDS (ref 9.3–11.4)
RBC # BLD AUTO: 2.92 MIL/UL (ref 4.5–6)
SODIUM SERPL-SCNC: 133 MMOL/L (ref 136–145)
WBC # BLD AUTO: 6.9 THOU/UL (ref 4–11)

## 2019-04-16 NOTE — NUR
Patient admits with CRF acute hyperkalemia. He has dialysis this admission
unclear if need for community dialysis. Met with patient and wife at bedside.
PTA patient resides at home with wife in split level home. Patient has 7 steps
to bedroom. He has been using a cane pta. He has become weak recently wife
reports. Discussed post acute care. Gave wife list of [facility] contracted
facilities to review. [facility] following

## 2019-04-16 NOTE — NUR
ASSUMED PT CARE AT 1900 WITH BEDSIDE REPORT COMPLETED. NO FAMILY AT BEDSIDE.
PT IS ALERT. NO SIGN OF DISTRESS NOTED. PT IS STABLE. VITAL SIGN STABLE. PT
IS TACHYCARDIC ON THE MONITOR. ASSESSMENT DONE AND CHARTED. SCHEDULED MEDS
ADMINISTERED TO PT. NO SIGN OF DISTRESS NOTED. DENIES ANY FURTHER NEEDS AT
THIS TIME.

## 2019-04-17 VITALS — SYSTOLIC BLOOD PRESSURE: 165 MMHG | DIASTOLIC BLOOD PRESSURE: 92 MMHG

## 2019-04-17 VITALS — SYSTOLIC BLOOD PRESSURE: 185 MMHG | DIASTOLIC BLOOD PRESSURE: 96 MMHG

## 2019-04-17 VITALS — DIASTOLIC BLOOD PRESSURE: 89 MMHG | SYSTOLIC BLOOD PRESSURE: 167 MMHG

## 2019-04-17 VITALS — SYSTOLIC BLOOD PRESSURE: 177 MMHG | DIASTOLIC BLOOD PRESSURE: 88 MMHG

## 2019-04-17 VITALS — SYSTOLIC BLOOD PRESSURE: 142 MMHG | DIASTOLIC BLOOD PRESSURE: 66 MMHG

## 2019-04-17 VITALS — SYSTOLIC BLOOD PRESSURE: 147 MMHG | DIASTOLIC BLOOD PRESSURE: 76 MMHG

## 2019-04-17 VITALS — DIASTOLIC BLOOD PRESSURE: 70 MMHG | SYSTOLIC BLOOD PRESSURE: 162 MMHG

## 2019-04-17 VITALS — DIASTOLIC BLOOD PRESSURE: 97 MMHG | SYSTOLIC BLOOD PRESSURE: 169 MMHG

## 2019-04-17 VITALS — DIASTOLIC BLOOD PRESSURE: 88 MMHG | SYSTOLIC BLOOD PRESSURE: 159 MMHG

## 2019-04-17 LAB
ALBUMIN SERPL-MCNC: 1.9 G/DL (ref 3.4–5)
ANION GAP SERPL CALC-SCNC: 15 MMOL/L (ref 7–16)
APTT BLD: 30.3 SECONDS (ref 24.5–32.8)
BUN SERPL-MCNC: 20 MG/DL (ref 7–18)
CALCIUM SERPL-MCNC: 7.6 MG/DL (ref 8.5–10.1)
CHLORIDE SERPL-SCNC: 98 MMOL/L (ref 98–107)
CO2 SERPL-SCNC: 21 MMOL/L (ref 21–32)
CREAT SERPL-MCNC: 3.7 MG/DL (ref 0.7–1.3)
ERYTHROCYTE [DISTWIDTH] IN BLOOD BY AUTOMATED COUNT: 17.1 % (ref 10.5–14.5)
GLUCOSE SERPL-MCNC: 104 MG/DL (ref 74–106)
HCT VFR BLD CALC: 24.8 % (ref 42–52)
HCT VFR BLD CALC: 24.9 % (ref 42–52)
HGB BLD-MCNC: 8.2 GM/DL (ref 14–18)
HGB BLD-MCNC: 8.2 GM/DL (ref 14–18)
INR PPP: 1.3
MCH RBC QN AUTO: 27.7 PG (ref 26–34)
MCHC RBC AUTO-ENTMCNC: 33.2 G/DL (ref 28–37)
MCV RBC: 83.4 FL (ref 80–100)
PHOSPHATE SERPL-MCNC: 2.6 MG/DL (ref 2.5–4.9)
PLATELET # BLD: 245 THOU/UL (ref 150–400)
POTASSIUM SERPL-SCNC: 3.3 MMOL/L (ref 3.5–5.1)
PROT/CREAT UR-RTO: 0.9
PROTHROMBIN TIME: 13.6 SECONDS (ref 9.3–11.4)
RBC # BLD AUTO: 2.97 MIL/UL (ref 4.5–6)
SODIUM SERPL-SCNC: 134 MMOL/L (ref 136–145)
URINE CREATININE-RANDOM*: 77.3 MG/DL
URINE PROTEIN-RANDOM*: 69.8 MG/DL (ref ?–11.9)
WBC # BLD AUTO: 7.3 THOU/UL (ref 4–11)

## 2019-04-17 PROCEDURE — 0TB13ZX EXCISION OF LEFT KIDNEY, PERCUTANEOUS APPROACH, DIAGNOSTIC: ICD-10-PCS | Performed by: RADIOLOGY

## 2019-04-17 NOTE — NUR
ASSUMED PT CARE AT 1900  WITH NO SIGN OF DISTRESS NOTED AT THIS TIME. FAMILY
AT BEDSIDE AT THE START OF SHIFT. PT IS STABLE, SCHEDULED MEDS ADMINISTERED TO
PT. VITAL SIGNS STABLE. PT IS NPO AFTER MN FOR A SCHEDULED RENAL BIOPSY. NO PT
IS STABLE . PT CONTINUES TO HAVE DIARRHEA. PT IS STABLE ON RROM AIR. DENIES
ANY NEEDS AT THIS TIME.

## 2019-04-17 NOTE — NUR
PATIENT IS ALERT, SLOW TO ANSWER. PATIENT HAD RENAL BIOPSY. INCREASED BLOOD
PRESSURE AND PHYSICIAN NOTIFIED, HYDRALAZINE ORDERED. COMPLAINT OF LEG PAIN IN
LOWER EXTREMITIES, ALSO SWELLING ON LEFT FOOT. DR. RECINOS NOTIFIED, NO ORDERS
GIVEN.

## 2019-04-18 VITALS — SYSTOLIC BLOOD PRESSURE: 146 MMHG | DIASTOLIC BLOOD PRESSURE: 82 MMHG

## 2019-04-18 VITALS — DIASTOLIC BLOOD PRESSURE: 87 MMHG | SYSTOLIC BLOOD PRESSURE: 150 MMHG

## 2019-04-18 VITALS — SYSTOLIC BLOOD PRESSURE: 150 MMHG | DIASTOLIC BLOOD PRESSURE: 79 MMHG

## 2019-04-18 VITALS — SYSTOLIC BLOOD PRESSURE: 157 MMHG | DIASTOLIC BLOOD PRESSURE: 87 MMHG

## 2019-04-18 VITALS — SYSTOLIC BLOOD PRESSURE: 154 MMHG | DIASTOLIC BLOOD PRESSURE: 80 MMHG

## 2019-04-18 LAB
ALBUMIN SERPL-MCNC: 2 G/DL (ref 3.4–5)
ANION GAP SERPL CALC-SCNC: 15 MMOL/L (ref 7–16)
BUN SERPL-MCNC: 23 MG/DL (ref 7–18)
CALCIUM SERPL-MCNC: 8.2 MG/DL (ref 8.5–10.1)
CHLORIDE SERPL-SCNC: 101 MMOL/L (ref 98–107)
CO2 SERPL-SCNC: 20 MMOL/L (ref 21–32)
CREAT SERPL-MCNC: 3.8 MG/DL (ref 0.7–1.3)
ERYTHROCYTE [DISTWIDTH] IN BLOOD BY AUTOMATED COUNT: 17.3 % (ref 10.5–14.5)
GLUCOSE SERPL-MCNC: 118 MG/DL (ref 74–106)
HCT VFR BLD CALC: 24 % (ref 42–52)
HGB BLD-MCNC: 8 GM/DL (ref 14–18)
MCH RBC QN AUTO: 27.8 PG (ref 26–34)
MCHC RBC AUTO-ENTMCNC: 33.4 G/DL (ref 28–37)
MCV RBC: 83.2 FL (ref 80–100)
PHOSPHATE SERPL-MCNC: 3.1 MG/DL (ref 2.5–4.9)
PLATELET # BLD: 235 THOU/UL (ref 150–400)
POTASSIUM SERPL-SCNC: 3.3 MMOL/L (ref 3.5–5.1)
RBC # BLD AUTO: 2.89 MIL/UL (ref 4.5–6)
SODIUM SERPL-SCNC: 136 MMOL/L (ref 136–145)
URIC ACID*: 5.7 MG/DL (ref 2.6–7.2)
WBC # BLD AUTO: 6.7 THOU/UL (ref 4–11)

## 2019-04-18 NOTE — NUR
FOLLOWING FOR DC PLANNING. CLINICAL INFO REVIEWED. NO DIALYSIS TODAY. RENAL BX
PATH PENDING. CONTINUES ON IVF WITHBICARB, IV ROCEPHIN FOR UTI. PT FROM HOME
WITH SPOUSE AND INDEPENDENT PTA, NOW WITH SIGNIFICANT DEBILITY AND PT/OT
WORKING WITH [facility]  MEGGAN ANDERSON HERE TO
DISCUSS PT NEEDS FOR DC PLANNING. CLINICAL UPDATE PROVIDED AND INDICATED
LOOKING FOR ACUTE CARE POS DC OPTION, LTAC VS ACUTE REHAB. UNSURE TIMELINE FOR
DC AND WILL DISCUSS WITH HOSPITALIST IN AM.

## 2019-04-18 NOTE — NUR
ASSESSMENT AS DOCUMENTED. PT ALERT AND ORIENTED. PRN PAIN MED GIVEN FOR RIGHT
HIP AND LEFT FOOT PAIN. UP IN THE CHAIR THIS SHIFT. WILL CONTINUE TO MONITOR.

## 2019-04-18 NOTE — NUR
PATIENTS CARES WERE ASSUMED AT SHIFT CHANGE. PATIENT WAS ASSESSED AND MEDS
WERE PASSED. PATIENT CONTINUES TO BECOME VERY COMFUSED AT NIGHT. THIS IS THE
SECOND NIGHT HE PULLED OUT HIS IV. THIS NURSE KEEP THE IV OUT AND WILL
RESSTART IN THE MORNING WHEN PATIENTS THOUGHTS ARE CLEARER. HOURLY ROUNDING
WAS DONE. PATIENT APPERED TO SLEEP WELL AFTER HE PULLED OUT IV. THE BED IS IN
A LOW AND LOCKED POSITION. THE BED ALARM IS SET ON THE MIDDLE SETTING.

## 2019-04-19 VITALS — SYSTOLIC BLOOD PRESSURE: 119 MMHG | DIASTOLIC BLOOD PRESSURE: 84 MMHG

## 2019-04-19 VITALS — DIASTOLIC BLOOD PRESSURE: 82 MMHG | SYSTOLIC BLOOD PRESSURE: 156 MMHG

## 2019-04-19 VITALS — SYSTOLIC BLOOD PRESSURE: 143 MMHG | DIASTOLIC BLOOD PRESSURE: 83 MMHG

## 2019-04-19 VITALS — SYSTOLIC BLOOD PRESSURE: 169 MMHG | DIASTOLIC BLOOD PRESSURE: 94 MMHG

## 2019-04-19 VITALS — DIASTOLIC BLOOD PRESSURE: 84 MMHG | SYSTOLIC BLOOD PRESSURE: 119 MMHG

## 2019-04-19 VITALS — DIASTOLIC BLOOD PRESSURE: 98 MMHG | SYSTOLIC BLOOD PRESSURE: 177 MMHG

## 2019-04-19 LAB
ALBUMIN SERPL-MCNC: 1.9 G/DL (ref 3.4–5)
ANION GAP SERPL CALC-SCNC: 14 MMOL/L (ref 7–16)
BUN SERPL-MCNC: 22 MG/DL (ref 7–18)
CALCIUM SERPL-MCNC: 7.9 MG/DL (ref 8.5–10.1)
CHLORIDE SERPL-SCNC: 101 MMOL/L (ref 98–107)
CO2 SERPL-SCNC: 22 MMOL/L (ref 21–32)
CREAT SERPL-MCNC: 3.4 MG/DL (ref 0.7–1.3)
ERYTHROCYTE [DISTWIDTH] IN BLOOD BY AUTOMATED COUNT: 17.1 % (ref 10.5–14.5)
GLUCOSE SERPL-MCNC: 94 MG/DL (ref 74–106)
HCT VFR BLD CALC: 24.1 % (ref 42–52)
HGB BLD-MCNC: 8 GM/DL (ref 14–18)
MCH RBC QN AUTO: 27.7 PG (ref 26–34)
MCHC RBC AUTO-ENTMCNC: 33.2 G/DL (ref 28–37)
MCV RBC: 83.5 FL (ref 80–100)
PHOSPHATE SERPL-MCNC: 2.3 MG/DL (ref 2.5–4.9)
PLATELET # BLD: 229 THOU/UL (ref 150–400)
POTASSIUM SERPL-SCNC: 3.5 MMOL/L (ref 3.5–5.1)
RBC # BLD AUTO: 2.88 MIL/UL (ref 4.5–6)
SODIUM SERPL-SCNC: 137 MMOL/L (ref 136–145)
WBC # BLD AUTO: 7.5 THOU/UL (ref 4–11)

## 2019-04-19 NOTE — NUR
APPEARS APPRORPIATE FOR ACUTE REHAB POST DISCHARGE. DISCUSSED WITH DR. BELLE TODAY AND AGREEABLE TO 5N CONSULT. 5N HILLARY BUTLER MET WITH PT TO
DISCUSS POSSIBLE 5N STAY AND PT AGREEBALE. DR. WELLS CONSULTED AND
REQUESTED 5N ADMISSIONS SUBMIT FOR AUTH WITH [facility].

## 2019-04-19 NOTE — NUR
PATIENT SEEN BY SHARIF FLORES NP WITH DR. WELLS. PATIENT MEETS CRITERIA
FOR ACUTE REHAB STAY AND AUTHORIZATION WAS REQUESTED FROM [facility] FOR PATIENT
TO ADMIT TO  ON 4/22/19. AWAITING RESPONSE FROM INSURANCE.

## 2019-04-19 NOTE — NUR
ASSESSMENT AS DOCUMENTED. PT ALERT AND ORIENTED WITH WITH FORGETFULNESS.
PLEASANT AND COOPERATIVE WITH CARES. PRN PAIN MED GIVEN WITH PARTIAL RELIEF.
WIFE AT THE BEDSIDE MOST OF THE SHIFT. HAD SWALLOW STUDY THIS SHIFT. NPO. SEE
SPEECH THERAPIST NOTES. GI CONSULTED FOR POSSIBLE PEG TUBE PLACEMENT ON
MONDAY. HAD HBP THIS SHIFT PRN BP MEDS GIVEN. NO CONCERNS AT THIS TIME. WILL
CONTINUE TO MONITOR.

## 2019-04-19 NOTE — NUR
ASSESSMENT AS DOCUMENTED. DENIES ANY PAIN. COUGHING NOTED AFTER GIVING WATER.
ASPIRATION PRECAUTION MAINTAINED. ABLE TO SLEEP WELL. DENIESS ANY PAIN.
MAINTAINED ON HIGH FALL RISK. FF UP POC.

## 2019-04-20 VITALS — DIASTOLIC BLOOD PRESSURE: 86 MMHG | SYSTOLIC BLOOD PRESSURE: 182 MMHG

## 2019-04-20 VITALS — SYSTOLIC BLOOD PRESSURE: 151 MMHG | DIASTOLIC BLOOD PRESSURE: 67 MMHG

## 2019-04-20 VITALS — SYSTOLIC BLOOD PRESSURE: 153 MMHG | DIASTOLIC BLOOD PRESSURE: 67 MMHG

## 2019-04-20 VITALS — DIASTOLIC BLOOD PRESSURE: 85 MMHG | SYSTOLIC BLOOD PRESSURE: 155 MMHG

## 2019-04-20 VITALS — SYSTOLIC BLOOD PRESSURE: 158 MMHG | DIASTOLIC BLOOD PRESSURE: 75 MMHG

## 2019-04-20 LAB
ALBUMIN SERPL-MCNC: 1.9 G/DL (ref 3.4–5)
ANION GAP SERPL CALC-SCNC: 15 MMOL/L (ref 7–16)
BUN SERPL-MCNC: 23 MG/DL (ref 7–18)
CALCIUM SERPL-MCNC: 8.1 MG/DL (ref 8.5–10.1)
CHLORIDE SERPL-SCNC: 102 MMOL/L (ref 98–107)
CO2 SERPL-SCNC: 23 MMOL/L (ref 21–32)
CREAT SERPL-MCNC: 3.4 MG/DL (ref 0.7–1.3)
GLUCOSE SERPL-MCNC: 112 MG/DL (ref 74–106)
PHOSPHATE SERPL-MCNC: 3 MG/DL (ref 2.5–4.9)
POTASSIUM SERPL-SCNC: 3.3 MMOL/L (ref 3.5–5.1)
SODIUM SERPL-SCNC: 140 MMOL/L (ref 136–145)

## 2019-04-20 NOTE — NUR
ASSUMED CARE OF PATIENT AT 0700. ASSESSMENTS AS CHARTED. PATIENT'S WIFE AT THE
BEDSIDE. ACCUCHECK ACHS, BLOOD SUGARS IN 'S, FLUIDS CHANGED TO
DEXTROSE. PATIENT REFUSED ALL PO MEDS TODAY. PATIENT IS NPO WITH THE EXCEPTION
OF MEDS WITH SIPS OF WATER. PATIENT COMPLAINED OF INCREASING DISCOMFORT
BREATHING AND WAS STARTED ON OXYGEN AT 3 LITERS WITH MINIMAL IMPROVEMENT.
PATIENT HAD A CT OF NECK/CHEST AND TOLERATED PROCEDURE WELL. PATIENT STARTED
ON ZOSYN. TEMPORARY DIALYSIS CATHETER REMOVED BY IV TEAM. PATIENT RESTING AND
WILL CONTINUE WITH POC.

## 2019-04-20 NOTE — NUR
ASSESSMENT AS DOCUMENTED. DISORIENTED TO DATE, REORIENTATION DONE. DYSPHAGIA
NOTED. ABLE TO TAKE ONE PILL ONLY BUT TAKES TIME TO SWALLOW, REFUSED REMAINING
PILL THAT WAS ALREADY CRUSHED. TURNING DONE. ORAL CARE RENDERED, BACK CARE AND
PERIANAL CARE DONE, MOISTURE BARRIER APPLIED. COMPLAINT OF PAIN ON THE RIGHT
KNEE AND RIGHT ANKLE AS WELL AS PAIN ON THE LEFT FOOT. REFUSED TO TAKE PO PAIN
MED. REPOSITIONING DONE Q 2 HOURS. FF UP POC.

## 2019-04-21 VITALS — SYSTOLIC BLOOD PRESSURE: 146 MMHG | DIASTOLIC BLOOD PRESSURE: 72 MMHG

## 2019-04-21 VITALS — DIASTOLIC BLOOD PRESSURE: 89 MMHG | SYSTOLIC BLOOD PRESSURE: 154 MMHG

## 2019-04-21 VITALS — SYSTOLIC BLOOD PRESSURE: 151 MMHG | DIASTOLIC BLOOD PRESSURE: 84 MMHG

## 2019-04-21 VITALS — SYSTOLIC BLOOD PRESSURE: 149 MMHG | DIASTOLIC BLOOD PRESSURE: 75 MMHG

## 2019-04-21 VITALS — SYSTOLIC BLOOD PRESSURE: 156 MMHG | DIASTOLIC BLOOD PRESSURE: 82 MMHG

## 2019-04-21 LAB
ALBUMIN SERPL-MCNC: 1.8 G/DL (ref 3.4–5)
ANION GAP SERPL CALC-SCNC: 15 MMOL/L (ref 7–16)
BE(VIVO): 0.7 MMOL/L
BUN SERPL-MCNC: 25 MG/DL (ref 7–18)
CALCIUM SERPL-MCNC: 7.8 MG/DL (ref 8.5–10.1)
CHLORIDE SERPL-SCNC: 104 MMOL/L (ref 98–107)
CO2 SERPL-SCNC: 23 MMOL/L (ref 21–32)
CREAT SERPL-MCNC: 3.4 MG/DL (ref 0.7–1.3)
GLUCOSE SERPL-MCNC: 170 MG/DL (ref 74–106)
HCO3 BLD-SCNC: 24.3 MMOL/L (ref 22–26)
PCO2 BLD: 34.5 MMHG (ref 35–45)
PHOSPHATE SERPL-MCNC: 3.6 MG/DL (ref 2.5–4.9)
PO2 BLD: 106.9 MMHG (ref 80–100)
POTASSIUM SERPL-SCNC: 3.1 MMOL/L (ref 3.5–5.1)
SODIUM SERPL-SCNC: 142 MMOL/L (ref 136–145)

## 2019-04-21 NOTE — NUR
ASSUMED CARE OF PT AT 0700.  PT ALERT AND ORIENTED TO SELF AND SITUATION.  PT
HAS MASS/GOITER IN THROAT AND AIRWAY MAINTAINED WITH NO NECESSARY
INTERVENTIONS.  PT HAS TROUBLE SWALLOWING AND HAS BEEN NPO WITH ORAL CARE.  PT
HAD DR. DIANE NEFF AND HE WITH SCOPE PT AT THE BEDSIDE IN AM - HURRICANE
SPRAY ORDERED.  VITALS WITHIN NORMAL LIMITS WITH BLOOD PRESSURE SLIGHTLY HIGH
AND HYDRALIZINE ORDERED FOR SBP >160.  PT WAS SINUS TACH SINUS RHYTHM ON
TELEMETRY.  DR. BELLE ORDERED C-DIFF TESTING AND SAMPLE SENT TO LAB.  PT IN
PRECAUTIONARY ISOLATION PENDING RESULTS.

## 2019-04-21 NOTE — NUR
ASSESSMENT AS DOCUMENTED.PT RESTING IN NO ACUTE DISTRESS AT THIS TIME.DENIES
PAIN.VSS.A/OX4.PT BEEN SLEEPING MOST OF THE NOC.EASY TO AROUSE, FOLLOWS
COMMANDS APPROPRIATELY.ON O2 AT 3LITERS PNC,SATS ADEQA[place].PT DENIES ANY S/SX
OF RESP DISTRESS.INCONTINENT OF URINE.LEFT SIDE OF THE NECK SWOLLEN AS WELL AS
KWAME LES.ELEVATED BLES.IVF INFUSING.NPO EXCEPT WITH MEDS THAT PT REFUSES D/T
DIFFICULTIES IN SWALLOWING.POC TO HAVE PEG TUBE PLACED .LUNGS HAVE SOME
CRACKLES.ON IV ANTIBIOTICS,TOLERATING.DIALYSIS CATHETER REMOVED BY IV TEAM
NURSE.NO ACTIVE BLEEDING NOTED AT THIS TIME.WILL CONT ITH POC.

## 2019-04-22 VITALS — DIASTOLIC BLOOD PRESSURE: 80 MMHG | SYSTOLIC BLOOD PRESSURE: 152 MMHG

## 2019-04-22 VITALS — DIASTOLIC BLOOD PRESSURE: 72 MMHG | SYSTOLIC BLOOD PRESSURE: 138 MMHG

## 2019-04-22 VITALS — SYSTOLIC BLOOD PRESSURE: 146 MMHG | DIASTOLIC BLOOD PRESSURE: 72 MMHG

## 2019-04-22 VITALS — SYSTOLIC BLOOD PRESSURE: 154 MMHG | DIASTOLIC BLOOD PRESSURE: 80 MMHG

## 2019-04-22 VITALS — DIASTOLIC BLOOD PRESSURE: 72 MMHG | SYSTOLIC BLOOD PRESSURE: 137 MMHG

## 2019-04-22 LAB
ALBUMIN SERPL-MCNC: 1.7 G/DL (ref 3.4–5)
ANION GAP SERPL CALC-SCNC: 11 MMOL/L (ref 7–16)
BF MACROPHAGE: 87
BF NEUTROPHILS: 9
BF NUCLEATED CELLS: 4282
BF RBC: 3105
BUN SERPL-MCNC: 24 MG/DL (ref 7–18)
CALCIUM SERPL-MCNC: 7.2 MG/DL (ref 8.5–10.1)
CHLORIDE SERPL-SCNC: 107 MMOL/L (ref 98–107)
CLARITY UR: (no result)
CO2 SERPL-SCNC: 27 MMOL/L (ref 21–32)
COLOR UR: YELLOW
CREAT SERPL-MCNC: 3.2 MG/DL (ref 0.7–1.3)
GLUCOSE SERPL-MCNC: 171 MG/DL (ref 74–106)
PHOSPHATE SERPL-MCNC: 2.8 MG/DL (ref 2.5–4.9)
POTASSIUM SERPL-SCNC: 3.2 MMOL/L (ref 3.5–5.1)
SODIUM SERPL-SCNC: 145 MMOL/L (ref 136–145)
SOURCE: (no result)
SOURCE: (no result)
SPECIMEN VOL 24H UR: 60 ML

## 2019-04-22 PROCEDURE — 0W993ZZ DRAINAGE OF RIGHT PLEURAL CAVITY, PERCUTANEOUS APPROACH: ICD-10-PCS

## 2019-04-22 NOTE — NUR
Sp with Dr Posadas plan transfer of patient to  for cont acute care and
oncology follow for tx plan with new MM diagnosis.  sp with Dr Posadas they
are unable to accept due to not accepting of insurance. [facility] noted to be
in network. Wife of patient agreeable to inquire with [facility] she prefers no
transfer to [facility]. SP with Livia dominguez at [facility] faxed scanned copy of
medical cards and face sheet for review. Updated wife at bedside.

## 2019-04-22 NOTE — NUR
ASSESSMENT AS DOCUMENTED.PT RESTING IN NO ACUTE DISTRESS.REMAINS ON O2 AT
3LITERS PNC,SATS ADEQUATE.PT CONTINUES TO C/O DIFFICULTY SWALLOWING.DENIES
PAINS.KNEE PAINS WHILE MOVING IN BED.KWAME LES ELEVATED D/T EDEMA.POC IS TO AHVE
BEDSIDE FIBEROPTIC SCOPE THIS AM.ISOLATION MAINTAINED FRO C-DIFF.PT STARTED ON
FLAGYL IVPB.WILL CONT TO MONITOR PER POC.

## 2019-04-22 NOTE — NUR
PT CARE ASSUMED APPROX 0700. PT DROWSY BUT EASILY AROUSABLE. ORIENTED X4 WITH
FORGETFULNESS. SOMETIMES CONFUSED IN CONVERSATION. DENIES PAIN AND SOA. VSS.
BS SLIGHTLY ELEVATED. NO SSI IN POC. TURNING PT Q2 HRS AND PRN. IVF CHANGED
THIS SHIFT. IV ABT REMAINS TO POC. ENTERIC CONTACT ISOLATION REMAINS TO POC.
WIFE AT BEDSIDE MOST OF SHIFT. SHE DENIES QUESTIONS OR CONCERNS REGARDING POC.
FAMILY AT BEDSIDE STILL. PT INCONT OF BOWEL AND BLADDER. THORACENTESIS
COMPLETED THIS SHIFT. PT RESTING CALMLY WITHOUT S/S OF DISTRESS NOTED.

## 2019-04-23 VITALS — SYSTOLIC BLOOD PRESSURE: 172 MMHG | DIASTOLIC BLOOD PRESSURE: 77 MMHG

## 2019-04-23 VITALS — DIASTOLIC BLOOD PRESSURE: 85 MMHG | SYSTOLIC BLOOD PRESSURE: 134 MMHG

## 2019-04-23 VITALS — DIASTOLIC BLOOD PRESSURE: 73 MMHG | SYSTOLIC BLOOD PRESSURE: 144 MMHG

## 2019-04-23 LAB
ALBUMIN FLD-MCNC: 1.6 G/DL
ALBUMIN SERPL-MCNC: 1.6 G/DL (ref 3.4–5)
AMYLASE FLD-CCNC: 353 U/L
ANION GAP SERPL CALC-SCNC: 13 MMOL/L (ref 7–16)
BODY FLUID LDH: 4398 IU/L
BUN SERPL-MCNC: 21 MG/DL (ref 7–18)
CALCIUM SERPL-MCNC: 7.3 MG/DL (ref 8.5–10.1)
CHLORIDE SERPL-SCNC: 110 MMOL/L (ref 98–107)
CO2 SERPL-SCNC: 25 MMOL/L (ref 21–32)
CREAT SERPL-MCNC: 2.8 MG/DL (ref 0.7–1.3)
GLUCOSE FLD-MCNC: 113 MG/DL
GLUCOSE SERPL-MCNC: 180 MG/DL (ref 74–106)
MAGNESIUM SERPL-MCNC: 1.1 MG/DL (ref 1.8–2.4)
PHOSPHATE SERPL-MCNC: 2.5 MG/DL (ref 2.5–4.9)
POTASSIUM SERPL-SCNC: 3.1 MMOL/L (ref 3.5–5.1)
PROT FLD-MCNC: 5.6 G/DL
SODIUM SERPL-SCNC: 148 MMOL/L (ref 136–145)

## 2019-04-23 PROCEDURE — 0GBG3ZX EXCISION OF LEFT THYROID GLAND LOBE, PERCUTANEOUS APPROACH, DIAGNOSTIC: ICD-10-PCS

## 2019-04-23 PROCEDURE — B54NZZA ULTRASONOGRAPHY OF LEFT UPPER EXTREMITY VEINS, GUIDANCE: ICD-10-PCS

## 2019-04-23 PROCEDURE — 05HY33Z INSERTION OF INFUSION DEVICE INTO UPPER VEIN, PERCUTANEOUS APPROACH: ICD-10-PCS | Performed by: RADIOLOGY

## 2019-04-23 NOTE — NUR
ASSESSMENT AS CHARTED - MEDS AS PER MAR -  GIVEN K+ AND MAG BOLUS.  PT HAS
REMAINED NPO THROUGHOUT THE DAY,  WENT TO INTERVENTIONAL RADIOLOGY AND HAD
CENTRAL LING PLACED -  AND THEN NECK BIOPSY COMPLETED.  PT UP  TO THE CHAIR
THIS AM - HEMANTH WELL.  NO CO'S OF PAIN OR NAUSEA.   PATIENT TRANSFERED TO
[facility] THIS AFTERNOON FOR FURTHER TREATMENT.   LEFT UNIT VIA
AMBULANCE - WIFE [place] ME[place] [facility]  NO CO'S AT TIME OF D/C. Rico Nugent is a 89 year old male presenting for   Chief Complaint   Patient presents with   • Medicare Wellness Visit     Denies Latex allergy or sensitivity.    Medication verified and med list updated  Refills needed today: Yes    Health Maintenance Summary     Topic Due On Due Status Completed On    Immunization-Zoster Mar 22, 1987 Overdue     Immunization - Pneumococcal  Completed Oct 23, 2015    Medicare Wellness Visit Oct 28, 2017 Due On Oct 28, 2016    IMMUNIZATION - DTaP/Tdap/Td Mar 22, 1946 Overdue     Immunization-Influenza Sep 1, 2017 Overdue Oct 28, 2016     Oct 28, 2017 Due On Oct 28, 2016            Patient is due for topics as listed above, he wishes to discuss with provider.

## 2019-04-23 NOTE — NUR
PT. TRANSFERRING TO [facility] TODAY FOR ONC. TREATMENT OF PT'S NECK
MASS. NOTIFIED PT AND WIFE AT BEDSIDE THAT TRANSPORATATION ARRANGED VIA
AMBULANCE FOR 1545. UNIT NOTIFIED AND CHART COPY PER US. RN GIVEN NUMBER TO
CALL FOR REPORT.

## 2019-04-23 NOTE — NUR
[facility] reported yesterday cannot accept they have a wait list and no plan to
place patient on wait list. JOSE A RN sp with insurance and [facility] not in
network with insurance. Discussed with wife. Family interested in [facility]. Sp
with [facility] who reports they do not have a hemo/onc specified unit. Discussed
with wife. Family interested in [facility]. JOSE A RN reports sp with insurance and
[facility] not in network. Sp with wife this am, discussed option of [facility]. She
was agreeable. [facility] accepting of patient Dr Nikhil Jones accepting. RN to
call report to Lisa. Patient to transfer via [facility] at 1545 to Room 4228.
Chart copied. Radiology uploaded to cloud at [facility] and hard copy placed in
chart. Wife reports she sp with insurance and they can utilize out of network
benefits. She reports if not pleased with [facility] will reconsider [facility] and out
of network benefits.

## 2019-04-23 NOTE — NUR
ASSUMED PT CARE AT 1900 WITH BEDSIDE REPORT TAKEN AND FAMILY AT BEDSIDE. NO
SIGN OF DISTRESS NOTED. PT IS LAYING IN BED AND RESTING COMFORTABLY. VITAL
SIGNS STABLE. PT IS NSR ON THE MONITOR. SCHEDULED MEDS CRUSHED AND
ADMINISTERED TO PT. PT IS STABLE THROUGHOUT THE NIGHT. DENIES ANY FURTHER
NEEDS AT THIS TIME

## 2019-08-22 ENCOUNTER — HOSPITAL ENCOUNTER (INPATIENT)
Dept: HOSPITAL 35 - ER | Age: 84
LOS: 19 days | Discharge: HOSPICE | DRG: 853 | End: 2019-09-10
Attending: INTERNAL MEDICINE | Admitting: INTERNAL MEDICINE
Payer: COMMERCIAL

## 2019-08-22 VITALS — SYSTOLIC BLOOD PRESSURE: 163 MMHG | DIASTOLIC BLOOD PRESSURE: 67 MMHG

## 2019-08-22 VITALS — SYSTOLIC BLOOD PRESSURE: 169 MMHG | DIASTOLIC BLOOD PRESSURE: 79 MMHG

## 2019-08-22 VITALS — HEIGHT: 70 IN | BODY MASS INDEX: 26.11 KG/M2 | WEIGHT: 182.4 LBS

## 2019-08-22 VITALS — SYSTOLIC BLOOD PRESSURE: 153 MMHG | DIASTOLIC BLOOD PRESSURE: 81 MMHG

## 2019-08-22 DIAGNOSIS — K21.9: ICD-10-CM

## 2019-08-22 DIAGNOSIS — Z91.041: ICD-10-CM

## 2019-08-22 DIAGNOSIS — C85.90: ICD-10-CM

## 2019-08-22 DIAGNOSIS — E43: ICD-10-CM

## 2019-08-22 DIAGNOSIS — E87.6: ICD-10-CM

## 2019-08-22 DIAGNOSIS — B96.89: ICD-10-CM

## 2019-08-22 DIAGNOSIS — K56.7: ICD-10-CM

## 2019-08-22 DIAGNOSIS — K66.1: ICD-10-CM

## 2019-08-22 DIAGNOSIS — G93.40: ICD-10-CM

## 2019-08-22 DIAGNOSIS — Z86.711: ICD-10-CM

## 2019-08-22 DIAGNOSIS — M46.28: ICD-10-CM

## 2019-08-22 DIAGNOSIS — I47.1: ICD-10-CM

## 2019-08-22 DIAGNOSIS — R41.0: ICD-10-CM

## 2019-08-22 DIAGNOSIS — N28.1: ICD-10-CM

## 2019-08-22 DIAGNOSIS — R59.1: ICD-10-CM

## 2019-08-22 DIAGNOSIS — E11.22: ICD-10-CM

## 2019-08-22 DIAGNOSIS — E78.00: ICD-10-CM

## 2019-08-22 DIAGNOSIS — Z92.21: ICD-10-CM

## 2019-08-22 DIAGNOSIS — Z51.5: ICD-10-CM

## 2019-08-22 DIAGNOSIS — I36.1: ICD-10-CM

## 2019-08-22 DIAGNOSIS — J69.0: ICD-10-CM

## 2019-08-22 DIAGNOSIS — D50.9: ICD-10-CM

## 2019-08-22 DIAGNOSIS — N39.0: ICD-10-CM

## 2019-08-22 DIAGNOSIS — Z87.442: ICD-10-CM

## 2019-08-22 DIAGNOSIS — B95.8: ICD-10-CM

## 2019-08-22 DIAGNOSIS — E86.0: ICD-10-CM

## 2019-08-22 DIAGNOSIS — L89.629: ICD-10-CM

## 2019-08-22 DIAGNOSIS — Z85.850: ICD-10-CM

## 2019-08-22 DIAGNOSIS — N18.9: ICD-10-CM

## 2019-08-22 DIAGNOSIS — K59.00: ICD-10-CM

## 2019-08-22 DIAGNOSIS — D64.9: ICD-10-CM

## 2019-08-22 DIAGNOSIS — Z87.891: ICD-10-CM

## 2019-08-22 DIAGNOSIS — I12.9: ICD-10-CM

## 2019-08-22 DIAGNOSIS — J90: ICD-10-CM

## 2019-08-22 DIAGNOSIS — R18.8: ICD-10-CM

## 2019-08-22 DIAGNOSIS — Z79.899: ICD-10-CM

## 2019-08-22 DIAGNOSIS — Z66: ICD-10-CM

## 2019-08-22 DIAGNOSIS — E87.5: ICD-10-CM

## 2019-08-22 DIAGNOSIS — E78.5: ICD-10-CM

## 2019-08-22 DIAGNOSIS — Z91.012: ICD-10-CM

## 2019-08-22 DIAGNOSIS — E03.9: ICD-10-CM

## 2019-08-22 DIAGNOSIS — B96.20: ICD-10-CM

## 2019-08-22 DIAGNOSIS — M10.9: ICD-10-CM

## 2019-08-22 DIAGNOSIS — J96.21: ICD-10-CM

## 2019-08-22 DIAGNOSIS — N17.0: ICD-10-CM

## 2019-08-22 DIAGNOSIS — L89.154: ICD-10-CM

## 2019-08-22 DIAGNOSIS — R59.9: ICD-10-CM

## 2019-08-22 DIAGNOSIS — G47.33: ICD-10-CM

## 2019-08-22 DIAGNOSIS — I49.9: ICD-10-CM

## 2019-08-22 DIAGNOSIS — A41.2: Primary | ICD-10-CM

## 2019-08-22 DIAGNOSIS — I48.91: ICD-10-CM

## 2019-08-22 DIAGNOSIS — E11.69: ICD-10-CM

## 2019-08-22 LAB
ALBUMIN SERPL-MCNC: 2 G/DL (ref 3.4–5)
ALT SERPL-CCNC: 17 U/L (ref 30–65)
ANION GAP SERPL CALC-SCNC: 11 MMOL/L (ref 7–16)
ANISOCYTOSIS BLD QL SMEAR: (no result)
AST SERPL-CCNC: 37 U/L (ref 15–37)
BASOPHILS NFR BLD AUTO: 1 % (ref 0–2)
BILIRUB SERPL-MCNC: 0.3 MG/DL
BILIRUB UR-MCNC: NEGATIVE MG/DL
BUN SERPL-MCNC: 24 MG/DL (ref 7–18)
CALCIUM SERPL-MCNC: 8.9 MG/DL (ref 8.5–10.1)
CHLORIDE SERPL-SCNC: 100 MMOL/L (ref 98–107)
CO2 SERPL-SCNC: 22 MMOL/L (ref 21–32)
COLOR UR: YELLOW
CREAT SERPL-MCNC: 2.2 MG/DL (ref 0.7–1.3)
EOSINOPHIL NFR BLD: 1 % (ref 0–3)
ERYTHROCYTE [DISTWIDTH] IN BLOOD BY AUTOMATED COUNT: 16.1 % (ref 10.5–14.5)
GLUCOSE SERPL-MCNC: 99 MG/DL (ref 74–106)
GRANULOCYTES NFR BLD MANUAL: 57 % (ref 36–66)
HCT VFR BLD CALC: 25.1 % (ref 42–52)
HGB BLD-MCNC: 8.5 GM/DL (ref 14–18)
KETONES UR STRIP-MCNC: NEGATIVE MG/DL
LYMPHOCYTES NFR BLD AUTO: 24 % (ref 24–44)
MCH RBC QN AUTO: 28.9 PG (ref 26–34)
MCHC RBC AUTO-ENTMCNC: 34 G/DL (ref 28–37)
MCV RBC: 85 FL (ref 80–100)
MONOCYTES NFR BLD: 17 % (ref 1–8)
NEUTROPHILS # BLD: 2.8 THOU/UL (ref 1.4–8.2)
PLATELET # BLD: 412 THOU/UL (ref 150–400)
POTASSIUM SERPL-SCNC: 4.1 MMOL/L (ref 3.5–5.1)
PROT SERPL-MCNC: 7.4 G/DL (ref 6.4–8.2)
RBC # BLD AUTO: 2.95 MIL/UL (ref 4.5–6)
RBC # UR STRIP: (no result) /UL
RBC #/AREA URNS HPF: (no result) /HPF (ref 0–2)
SODIUM SERPL-SCNC: 133 MMOL/L (ref 136–145)
SP GR UR STRIP: <= 1.005 (ref 1–1.03)
URINE CLARITY: CLEAR
URINE GLUCOSE-RANDOM*: NEGATIVE
URINE LEUKOCYTES-REFLEX: (no result)
URINE NITRITE-REFLEX: NEGATIVE
URINE PROTEIN (DIPSTICK): (no result)
URINE WBC-REFLEX: (no result) /HPF (ref 0–5)
UROBILINOGEN UR STRIP-ACNC: 0.2 E.U./DL (ref 0.2–1)
WBC # BLD AUTO: 4.9 THOU/UL (ref 4–11)

## 2019-08-22 PROCEDURE — 62110: CPT

## 2019-08-22 PROCEDURE — 10194: CPT

## 2019-08-22 PROCEDURE — 70005: CPT

## 2019-08-22 PROCEDURE — 10081 I&D PILONIDAL CYST COMP: CPT

## 2019-08-22 PROCEDURE — 10203: CPT

## 2019-08-22 PROCEDURE — 57192: CPT

## 2019-08-22 PROCEDURE — 50010 RENAL EXPLORATION: CPT

## 2019-08-22 PROCEDURE — 10084: CPT

## 2019-08-22 PROCEDURE — 10078: CPT

## 2019-08-22 PROCEDURE — 50403: CPT

## 2019-08-22 PROCEDURE — 50101: CPT

## 2019-08-22 PROCEDURE — 57119: CPT

## 2019-08-22 PROCEDURE — 10045: CPT

## 2019-08-22 PROCEDURE — 62900: CPT

## 2019-08-22 PROCEDURE — 85076: CPT

## 2019-08-22 PROCEDURE — 50386 REMOVE STENT VIA TRANSURETH: CPT

## 2019-08-22 PROCEDURE — 56524: CPT

## 2019-08-22 PROCEDURE — 57120 COLPOCLEISIS LE FORT TYPE: CPT

## 2019-08-23 VITALS — DIASTOLIC BLOOD PRESSURE: 56 MMHG | SYSTOLIC BLOOD PRESSURE: 112 MMHG

## 2019-08-23 VITALS — SYSTOLIC BLOOD PRESSURE: 125 MMHG | DIASTOLIC BLOOD PRESSURE: 56 MMHG

## 2019-08-23 VITALS — DIASTOLIC BLOOD PRESSURE: 45 MMHG | SYSTOLIC BLOOD PRESSURE: 118 MMHG

## 2019-08-23 VITALS — DIASTOLIC BLOOD PRESSURE: 64 MMHG | SYSTOLIC BLOOD PRESSURE: 135 MMHG

## 2019-08-23 LAB — ALBUMIN SERPL-MCNC: 1.7 G/DL (ref 3.4–5)

## 2019-08-23 NOTE — NUR
PATIENT ARRIVED ON UNIT VIA CART FROM ED.  ACCOMPANIED BY ED PERSONEL.
PATIENT ALERT AND ORIENTED X4.  HAS A WOUND ON SACRUM AND ON INNER L HEEL.  IV
FLUIDS RUNNING.  C/O PAIN BUT BY THE TIME PAIN MED
ORDERED PATIENT WAS ASLEEP.
SLEPT MOST OF NIGHT.

## 2019-08-23 NOTE — NUR
PATIENT HAS RESTED IN BED THROUGH THE DAY. HE IS CONFUSED MOST OF THE TIME. Q2
TURNS. RESPIRATIONS NON LABORED.. WILL CONT WITH PLAN OF CARE.

## 2019-08-23 NOTE — NUR
ASSESSMENT-PT LIVES AT HOME WITH HIS WIFE WHO IS 2 YRS YOUNGER THAN HE, HIS
DTR AND GRANDDTR.  PT SAYS HE HAS NOT DRIVEN SINCE HE CAME OUT OF REHAB ABOUT
6 WKS AGO.  PT USES A WALKER OR A CANE TO GET AROUND.  WIFE AND DTR DO THE
HOUSEHOLD THINGS.  PT IS ON SEVICE WITH [facility] [phone] INDY.
PT GIVEN CHUY ON [facility] DIRECTIVES.  FOLLOWING TO ASSIST WITH DC PLANNING.
PT COMPLAINING OF LOTS OF PAIN & THIS WAS REPORTED TO THE RN.

## 2019-08-24 VITALS — SYSTOLIC BLOOD PRESSURE: 113 MMHG | DIASTOLIC BLOOD PRESSURE: 46 MMHG

## 2019-08-24 VITALS — SYSTOLIC BLOOD PRESSURE: 124 MMHG | DIASTOLIC BLOOD PRESSURE: 78 MMHG

## 2019-08-24 VITALS — SYSTOLIC BLOOD PRESSURE: 144 MMHG | DIASTOLIC BLOOD PRESSURE: 71 MMHG

## 2019-08-24 LAB
ALBUMIN SERPL-MCNC: 1.6 G/DL (ref 3.4–5)
ALT SERPL-CCNC: 13 U/L (ref 30–65)
ANION GAP SERPL CALC-SCNC: 10 MMOL/L (ref 7–16)
ANISOCYTOSIS BLD QL SMEAR: (no result)
AST SERPL-CCNC: 32 U/L (ref 15–37)
BASOPHILS NFR BLD AUTO: 0 % (ref 0–2)
BILIRUB SERPL-MCNC: 0.2 MG/DL
BUN SERPL-MCNC: 19 MG/DL (ref 7–18)
CALCIUM SERPL-MCNC: 8.1 MG/DL (ref 8.5–10.1)
CHLORIDE SERPL-SCNC: 106 MMOL/L (ref 98–107)
CO2 SERPL-SCNC: 20 MMOL/L (ref 21–32)
CREAT SERPL-MCNC: 2.1 MG/DL (ref 0.7–1.3)
EOSINOPHIL NFR BLD: 2 % (ref 0–3)
ERYTHROCYTE [DISTWIDTH] IN BLOOD BY AUTOMATED COUNT: 16.1 % (ref 10.5–14.5)
GLUCOSE SERPL-MCNC: 111 MG/DL (ref 74–106)
GRANULOCYTES NFR BLD MANUAL: 65 % (ref 36–66)
HCT VFR BLD CALC: 21.9 % (ref 42–52)
HCT VFR BLD CALC: 22.2 % (ref 42–52)
HGB BLD-MCNC: 7.2 GM/DL (ref 14–18)
HGB BLD-MCNC: 7.5 GM/DL (ref 14–18)
LYMPHOCYTES NFR BLD AUTO: 16 % (ref 24–44)
MCH RBC QN AUTO: 28.7 PG (ref 26–34)
MCHC RBC AUTO-ENTMCNC: 33 G/DL (ref 28–37)
MCV RBC: 87.1 FL (ref 80–100)
MONOCYTES NFR BLD: 9 % (ref 1–8)
NEUTROPHILS # BLD: 2.6 THOU/UL (ref 1.4–8.2)
NEUTS BAND NFR BLD: 8 % (ref 0–8)
PLATELET # BLD: 366 THOU/UL (ref 150–400)
POTASSIUM SERPL-SCNC: 3.9 MMOL/L (ref 3.5–5.1)
PROT SERPL-MCNC: 6.3 G/DL (ref 6.4–8.2)
RBC # BLD AUTO: 2.52 MIL/UL (ref 4.5–6)
SODIUM SERPL-SCNC: 136 MMOL/L (ref 136–145)
WBC # BLD AUTO: 3.6 THOU/UL (ref 4–11)

## 2019-08-24 NOTE — NUR
ASSUMD CARE OF PATIENT AT 0715, PATIENT ALERT AND ORIENTED, C/O SOME BACL
DISCOMFORT, BUT REFUSED PAIN MEDS THIS SHIFT. PATIENT BEDREST, PHYSICAL
THERAPY WORKED WITH THE PATIENT AT BEDSIDE, PATIENT VERY WEAK, ASSISTED WITH
[facility] ASSISTNTO THE [facility]. PATIENT WENT ON CART TO HAVE MRI OF COCCYX AREA. WOUND
CARE DOEN TO COCCYX AREA AND LEFT HEEL. PATIENT HAS RIGHT CHEST MIDLINE IN
PLACE WITH NS AT 100CC/HR AND RECEIVED IV ANTIBIOTIC X 3 THIS SHIFT. SPEECH
EVALUATED THE PATIENT FOR SWALLOWINT, OK FOR REGULAR DIET. PATIENT
REPOSITIONED THROUGHOUT THE SHIFT, AIR LOSS MATTRESS. BLOOD SUGAR MONITORING
AS ORDERED, PATIENT REFUSED MAGIC CUP SUPPLEMENTS. PEG TUBE TO ABD. NOT IN
USE, DRESSING IN PLACE, WILL CONTINUE TO MONITOR.

## 2019-08-24 NOTE — NUR
ASSUMED CARE OF PT @1900. PT A&OX4. Q2TURN. PT HAS A COCCYX WOUND. DRESSING
CHANGED. PT IS ON AIR FLOW MATRESS. PT HAS A BLISTER ON LEFT HEEL. HEELS
OFFLOADED. PLAN IS TO TAKE PT DOWN FOR MRI OF THE SACRUM TO EVALUATE FOR
OESTEOMYLYTIS. PT IS BEDREST. FALL PRC IN PLACE. CALL BELL WITHIN REACH

## 2019-08-25 VITALS — DIASTOLIC BLOOD PRESSURE: 52 MMHG | SYSTOLIC BLOOD PRESSURE: 135 MMHG

## 2019-08-25 VITALS — SYSTOLIC BLOOD PRESSURE: 136 MMHG | DIASTOLIC BLOOD PRESSURE: 53 MMHG

## 2019-08-25 VITALS — SYSTOLIC BLOOD PRESSURE: 152 MMHG | DIASTOLIC BLOOD PRESSURE: 71 MMHG

## 2019-08-25 VITALS — DIASTOLIC BLOOD PRESSURE: 60 MMHG | SYSTOLIC BLOOD PRESSURE: 134 MMHG

## 2019-08-25 LAB
FOLATE SERPL-MCNC: 4.3 NG/ML (ref 8.6–58.9)
IRON SERPL-MCNC: 14 UG/DL (ref 65–175)
SAO2 % BLD FROM PO2: 12 % (ref 20–39)
TIBC SERPL-MCNC: 116 UG/DL (ref 250–450)
VIT B12 SERPL-MCNC: 709 PG/ML (ref 193–986)

## 2019-08-25 NOTE — NUR
NO CHANGE SINCE AM ASSESMENT. PT REFUSING HIS SUPPLEMENTS FOR BOTH BREAKFAST
AND LUNCH. REPOSITIONED. DRESSING REMAINED CDI. WILL CONT. TO MONITOR.

## 2019-08-25 NOTE — NUR
RECEIVED PT CARE APPROX 0715. A/O/FLAT. DENIES PAIN. NO NOTED SOA. NO NV. MEDS
GIVEN AS ORDERED TOLERATED WELL. BATH GIVEN BY PCA TODAY. DRESSING CHANGED AS
ORDERED. PT RESTING IN BED AT THIS TIME. WILL CONT. TO MONITOR.

## 2019-08-25 NOTE — NUR
ASSESSMENT COMPLETED. PT REPOSITIONED. DENIES PAIN. PT RELUCTANTLY AGREES TO
BE REPOSITIONED. JUST DOES NOT LKE TO BE BOTHERED. SWALLOWED HS MEDS OKAY.
SACCRAL WOUND DRS CHANGE DONE.BLE ELEVATED. AFEBRILE.USES URINAL. HE REMAINS
ALERT AND ORIENTED. ON CONTINUOUS IV FLUIDS.NO FURTHER CONCERNS AT THIS
TIME.CALL LIGHT WITHIN REACH.

## 2019-08-25 NOTE — NUR
PT NOTES
ASSUMED CARE OF PT @1900. PT A&OX4. PT HAS A FLAT AFFECT AND DOESN'T WANT TO
BE BORDERED. DRESSING ON COCCYX WOUND CLEAN, DRY AND INTACT. PT DECLINED Q2
REPOSITIONING, DECLINES PO VANCO. PT HAD A TEMP , TYLENOL WAS GIVEN AND
TEMP CAME DOWN TO 99.5. H&H WAS 7.5. PLAN IS TO TRANSFUSE IF <7.

## 2019-08-26 VITALS — DIASTOLIC BLOOD PRESSURE: 60 MMHG | SYSTOLIC BLOOD PRESSURE: 114 MMHG

## 2019-08-26 VITALS — SYSTOLIC BLOOD PRESSURE: 177 MMHG | DIASTOLIC BLOOD PRESSURE: 67 MMHG

## 2019-08-26 VITALS — DIASTOLIC BLOOD PRESSURE: 55 MMHG | SYSTOLIC BLOOD PRESSURE: 132 MMHG

## 2019-08-26 VITALS — DIASTOLIC BLOOD PRESSURE: 68 MMHG | SYSTOLIC BLOOD PRESSURE: 151 MMHG

## 2019-08-26 NOTE — NUR
Pt not eating enough to meet nutrition needs for stage IV wound.  Refuses all
oral supplement drinks.  Recommend use PEG for supplemental feedings-suggest
2cal HN, 60ml/hr x 8 hr nocturnal.

## 2019-08-26 NOTE — NUR
ASSESMENT COMPLETED. VSS. A/O. DENIES PAIN. NO NOTED SOA. NO NV. PT RESTING IN
BED APPEARS COMFORTABLE. PT RESTING IN BED APPEARS COMFORTABLE. REPOSITIONED.
DRESSING CHANGED AS ORDERED. WILL CONT. TO MONITOR.

## 2019-08-27 VITALS — SYSTOLIC BLOOD PRESSURE: 130 MMHG | DIASTOLIC BLOOD PRESSURE: 86 MMHG

## 2019-08-27 VITALS — DIASTOLIC BLOOD PRESSURE: 48 MMHG | SYSTOLIC BLOOD PRESSURE: 117 MMHG

## 2019-08-27 VITALS — SYSTOLIC BLOOD PRESSURE: 126 MMHG | DIASTOLIC BLOOD PRESSURE: 76 MMHG

## 2019-08-27 VITALS — DIASTOLIC BLOOD PRESSURE: 73 MMHG | SYSTOLIC BLOOD PRESSURE: 125 MMHG

## 2019-08-27 VITALS — DIASTOLIC BLOOD PRESSURE: 56 MMHG | SYSTOLIC BLOOD PRESSURE: 132 MMHG

## 2019-08-27 LAB
ANION GAP SERPL CALC-SCNC: 8 MMOL/L (ref 7–16)
BUN SERPL-MCNC: 14 MG/DL (ref 7–18)
CALCIUM SERPL-MCNC: 8 MG/DL (ref 8.5–10.1)
CHLORIDE SERPL-SCNC: 105 MMOL/L (ref 98–107)
CO2 SERPL-SCNC: 20 MMOL/L (ref 21–32)
CREAT SERPL-MCNC: 2 MG/DL (ref 0.7–1.3)
ERYTHROCYTE [DISTWIDTH] IN BLOOD BY AUTOMATED COUNT: 16.2 % (ref 10.5–14.5)
GLUCOSE SERPL-MCNC: 121 MG/DL (ref 74–106)
HCT VFR BLD CALC: 22.3 % (ref 42–52)
HGB BLD-MCNC: 7.4 GM/DL (ref 14–18)
MCH RBC QN AUTO: 28.8 PG (ref 26–34)
MCHC RBC AUTO-ENTMCNC: 33.2 G/DL (ref 28–37)
MCV RBC: 86.5 FL (ref 80–100)
PLATELET # BLD: 357 THOU/UL (ref 150–400)
POTASSIUM SERPL-SCNC: 3.8 MMOL/L (ref 3.5–5.1)
RBC # BLD AUTO: 2.57 MIL/UL (ref 4.5–6)
SODIUM SERPL-SCNC: 133 MMOL/L (ref 136–145)
WBC # BLD AUTO: 4.1 THOU/UL (ref 4–11)

## 2019-08-27 NOTE — NUR
ASSUMED CARE OF PATIENT AT 0715, PATIENT ALERT AND ORIENTED X 4. PATIENT C/O
BACK PAIN, HYDROCODONE 1 TABLET GIVEN WITH PARTIAL RELIEF, PATIENT HAS CHRONIC
BACK PAIN. PATIENT WORKED WITH OT, AND C/O SOME SOB, PATIENT ASSISTED WITH HIS
BATHM AND ASSISTED TO THE CHAIR. VSS THIS AM SATS 94% ON ROOM AIR. PHYSICAL
THERAPY WORKED WITH THE PATIENT AND HEART RATE ELEVATED 'S AND DOWN TO
140'S AFTER IN BED, [ATIENT C/O SOB WITH NOTED LABORED BREATHING, O2 APPLIED.
NURSE MANAGER/BIMAL ON THE UNIT, PORTABLE TELE MONITOR APPLIED, HEARTRATE
120'S. THIS RN NOTIFED DR. BARRERA, RECEIVED ORDERS FOR LASIX 40 MG IV X 1 STAT,
12 LEAD EKG, WHICH WAS DONE AND SHOWED ST. D/C IV FLUIDS, TROPONIN LAB,
TRANSFER TO CCU/TELE. CT SCAN OF CHEST/ABD/PELVIS W/O CONTRAST. ECHO COMPLETE.
LAST /72,  RESP 24. REPORT GIVEN TO ALEJA REDDY/RN AT BEDSIDE, THIS
RN ASSISTED WITH TRANSFERRING PATIENT TO [facility]. WIFE ARRIVED TO ROOM PRIOR TO
TRANSFER. PATIENT WAS ABLE TO ANSWER QUESTIONS ASKED. WILL CONTINUE TO
MONITOR.

## 2019-08-27 NOTE — NUR
RECEIVED PT FROM 4E, VSS TELEM SHOWING ST RATE 100-130, LUNGS DIMINISHED, O2
SAT 2L % PT DIEURESING WELL WITH IV LASIX UO 1200. APPETITE GOOD TODAY
ATE 50% TRAYS AND 2 MAGIV CUPS. WILL CONTINUE TO MONITER AND CARE FOR PTPER
PLAN OF CARE

## 2019-08-27 NOTE — NUR
ASSUMED PT CARE 1900. PT ALERT AND ORIENTED. REASSESSMENT COMPLETE. VSS.
DENIES PAIN, DENIES N/V. REFUSING TURNS. DRESSING TO SACRUM DONE. TUBE FEEDING
STARTED AT 0015 AT 30ML/ HOUR, RESIDUAL CHECK AND INCREASED RATE AT 0215 TO
40ML/HOUR, TOLERATING FEEDING. WORKING TOWARD POC. CALL LIGHT ADN PERSONAL
BELONINGS WITHIN REACH, WILL CONTINUE POC UNTIL EOS.

## 2019-08-28 VITALS — DIASTOLIC BLOOD PRESSURE: 65 MMHG | SYSTOLIC BLOOD PRESSURE: 122 MMHG

## 2019-08-28 VITALS — SYSTOLIC BLOOD PRESSURE: 148 MMHG | DIASTOLIC BLOOD PRESSURE: 88 MMHG

## 2019-08-28 VITALS — SYSTOLIC BLOOD PRESSURE: 115 MMHG | DIASTOLIC BLOOD PRESSURE: 51 MMHG

## 2019-08-28 VITALS — DIASTOLIC BLOOD PRESSURE: 73 MMHG | SYSTOLIC BLOOD PRESSURE: 122 MMHG

## 2019-08-28 VITALS — SYSTOLIC BLOOD PRESSURE: 125 MMHG | DIASTOLIC BLOOD PRESSURE: 62 MMHG

## 2019-08-28 VITALS — DIASTOLIC BLOOD PRESSURE: 86 MMHG | SYSTOLIC BLOOD PRESSURE: 146 MMHG

## 2019-08-28 LAB
ALBUMIN SERPL-MCNC: 1.5 G/DL (ref 3.4–5)
ALT SERPL-CCNC: 13 U/L (ref 30–65)
ANION GAP SERPL CALC-SCNC: 7 MMOL/L (ref 7–16)
AST SERPL-CCNC: 79 U/L (ref 15–37)
BILIRUB SERPL-MCNC: 0.2 MG/DL
BUN SERPL-MCNC: 16 MG/DL (ref 7–18)
CALCIUM SERPL-MCNC: 8.2 MG/DL (ref 8.5–10.1)
CHLORIDE SERPL-SCNC: 110 MMOL/L (ref 98–107)
CO2 SERPL-SCNC: 22 MMOL/L (ref 21–32)
CREAT SERPL-MCNC: 2.2 MG/DL (ref 0.7–1.3)
ERYTHROCYTE [DISTWIDTH] IN BLOOD BY AUTOMATED COUNT: 16.2 % (ref 10.5–14.5)
GLUCOSE SERPL-MCNC: 79 MG/DL (ref 74–106)
HCT VFR BLD CALC: 22.1 % (ref 42–52)
HGB BLD-MCNC: 7.5 GM/DL (ref 14–18)
MCH RBC QN AUTO: 29.2 PG (ref 26–34)
MCHC RBC AUTO-ENTMCNC: 33.9 G/DL (ref 28–37)
MCV RBC: 86.3 FL (ref 80–100)
PLATELET # BLD: 340 THOU/UL (ref 150–400)
POTASSIUM SERPL-SCNC: 3.7 MMOL/L (ref 3.5–5.1)
PROT SERPL-MCNC: 6.3 G/DL (ref 6.4–8.2)
RBC # BLD AUTO: 2.56 MIL/UL (ref 4.5–6)
SODIUM SERPL-SCNC: 139 MMOL/L (ref 136–145)
WBC # BLD AUTO: 4.1 THOU/UL (ref 4–11)

## 2019-08-28 NOTE — NUR
pt given tylenol for c/o back pain, pt refusing q2 hour turns was able to turn
a few times, vss, will con't to monitor per ppoc.

## 2019-08-28 NOTE — NUR
ASSUMED CARE AT 0700, SHIFT ASSESSMENT DONE, MEDS GIVEN, VSS. REPORTED PAIN,
PRN PAIN MEDS GIVEN. WORKED WITH PHYSICAL THERAPHY, WAS TACHYCARDIC, UPTO
120s, GOT A BED BATH. REMAINS ON 2L NC, WOUND CARE DONE AS PER ORDER. WILL
CONTINUE TO ASSESS AND ASSIST WITH ADLs AS NEEDED.

## 2019-08-28 NOTE — NUR
IV LASIX ADMINSITERED AS PER DR BARRERA, BLADDER SCAN DONEM 84 MLS WAS SCANNED.
REPORT GIVEN TO NIGHT NURSE

## 2019-08-29 VITALS — DIASTOLIC BLOOD PRESSURE: 76 MMHG | SYSTOLIC BLOOD PRESSURE: 141 MMHG

## 2019-08-29 VITALS — DIASTOLIC BLOOD PRESSURE: 74 MMHG | SYSTOLIC BLOOD PRESSURE: 138 MMHG

## 2019-08-29 VITALS — DIASTOLIC BLOOD PRESSURE: 79 MMHG | SYSTOLIC BLOOD PRESSURE: 143 MMHG

## 2019-08-29 VITALS — DIASTOLIC BLOOD PRESSURE: 77 MMHG | SYSTOLIC BLOOD PRESSURE: 143 MMHG

## 2019-08-29 VITALS — DIASTOLIC BLOOD PRESSURE: 61 MMHG | SYSTOLIC BLOOD PRESSURE: 129 MMHG

## 2019-08-29 LAB
ANION GAP SERPL CALC-SCNC: 9 MMOL/L (ref 7–16)
BUN SERPL-MCNC: 17 MG/DL (ref 7–18)
CALCIUM SERPL-MCNC: 8.7 MG/DL (ref 8.5–10.1)
CHLORIDE SERPL-SCNC: 109 MMOL/L (ref 98–107)
CO2 SERPL-SCNC: 21 MMOL/L (ref 21–32)
CREAT SERPL-MCNC: 2.3 MG/DL (ref 0.7–1.3)
ERYTHROCYTE [DISTWIDTH] IN BLOOD BY AUTOMATED COUNT: 16.7 % (ref 10.5–14.5)
GLUCOSE SERPL-MCNC: 67 MG/DL (ref 74–106)
HCT VFR BLD CALC: 22.7 % (ref 42–52)
HGB BLD-MCNC: 7.5 GM/DL (ref 14–18)
MCH RBC QN AUTO: 28.8 PG (ref 26–34)
MCHC RBC AUTO-ENTMCNC: 33.3 G/DL (ref 28–37)
MCV RBC: 86.7 FL (ref 80–100)
PLATELET # BLD: 362 THOU/UL (ref 150–400)
POTASSIUM SERPL-SCNC: 3.8 MMOL/L (ref 3.5–5.1)
RBC # BLD AUTO: 2.61 MIL/UL (ref 4.5–6)
SODIUM SERPL-SCNC: 139 MMOL/L (ref 136–145)
WBC # BLD AUTO: 5 THOU/UL (ref 4–11)

## 2019-08-29 NOTE — NUR
pt resting quietly in room, turning and repositioning as tolerated by pt, prn
tylenol given for left sided pain, voiding per urinal, npo since mnoc for
debriedment today, report given to next shift to con't with ppoc.

## 2019-08-29 NOTE — NUR
Pt has I/D of wound and diverting ostomy this morning. Dc planning visit
provided at bedside this afternoon to discuss possible ltac referral. LTAC
listing provided and level of care needs, acute hospital days, and options
discussed. Both are interested in ltac level of care due to pt's wound care,
iv atb, and therapy needs. The pt has 19 acute days and 60 life time reserve
days. [facility] liason contacted and she will be here around 1130 tomorrow to
visit with them. Wife to tour in the afternoon. Dc planner to fax referral for
their review. Ins auth will be needed for ltac admission. Case discussed with
the care team. [facility] is in network with the pt's ins plan and is the most
convient for his wife as she does not like to drive on the freeway.

## 2019-08-29 NOTE — NUR
ASSUMED CARE OF PATIENT AT 0700. PATIENT TO SURGERY AT 0800 FOR COLOSTOMY AND
EXC. AND DEBRIDEMENT OF SACROCOCCYGEAL ULCER. PATIENT RETURNED TO  AT 1300.
PATIENT'S WIFE AT THE BEDSIDE. PATIENT ALERT AND ORIENTED X 4 UPON RETURN TO
. PATIENT GIVEN HYDROCODONE FOR COCCYX PAIN AND TOLERATED WELL. IV ABX
GIVEN. BLOOD SUGARS CHECKED AC&HS. PATIENT REFUSED PRAFO BOOTS AND Q2 TURNS.
PATIENT TO CONTINUE WITH POC.

## 2019-08-30 VITALS — SYSTOLIC BLOOD PRESSURE: 108 MMHG | DIASTOLIC BLOOD PRESSURE: 51 MMHG

## 2019-08-30 VITALS — DIASTOLIC BLOOD PRESSURE: 69 MMHG | SYSTOLIC BLOOD PRESSURE: 123 MMHG

## 2019-08-30 VITALS — DIASTOLIC BLOOD PRESSURE: 46 MMHG | SYSTOLIC BLOOD PRESSURE: 111 MMHG

## 2019-08-30 VITALS — SYSTOLIC BLOOD PRESSURE: 107 MMHG | DIASTOLIC BLOOD PRESSURE: 54 MMHG

## 2019-08-30 VITALS — DIASTOLIC BLOOD PRESSURE: 60 MMHG | SYSTOLIC BLOOD PRESSURE: 131 MMHG

## 2019-08-30 VITALS — DIASTOLIC BLOOD PRESSURE: 60 MMHG | SYSTOLIC BLOOD PRESSURE: 126 MMHG

## 2019-08-30 LAB
ALBUMIN SERPL-MCNC: 1.5 G/DL (ref 3.4–5)
ALT SERPL-CCNC: 9 U/L (ref 30–65)
ANION GAP SERPL CALC-SCNC: 10 MMOL/L (ref 7–16)
AST SERPL-CCNC: 126 U/L (ref 15–37)
BILIRUB SERPL-MCNC: 0.2 MG/DL
BUN SERPL-MCNC: 22 MG/DL (ref 7–18)
CALCIUM SERPL-MCNC: 7.9 MG/DL (ref 8.5–10.1)
CHLORIDE SERPL-SCNC: 107 MMOL/L (ref 98–107)
CO2 SERPL-SCNC: 20 MMOL/L (ref 21–32)
CREAT SERPL-MCNC: 2.7 MG/DL (ref 0.7–1.3)
GLUCOSE SERPL-MCNC: 159 MG/DL (ref 74–106)
HCT VFR BLD CALC: 20.9 % (ref 42–52)
HGB BLD-MCNC: 7 GM/DL (ref 14–18)
POTASSIUM SERPL-SCNC: 4.1 MMOL/L (ref 3.5–5.1)
PROT SERPL-MCNC: 6.3 G/DL (ref 6.4–8.2)
SODIUM SERPL-SCNC: 137 MMOL/L (ref 136–145)

## 2019-08-30 NOTE — NUR
ASSESSMENT AS DOCUMENTED.PT RESTING IN BED AT THIS TIME IN NO ACUTE
DISTRESS.A/OX4.VSS.TACHY ON MONITOR WITH HRS IN 120S-130S.ON O2 AT 2LITERS
PNC,SATS ADEQUATE.PAIN MEDS GIVEN FOR BACK/LEGS/WOUND PAIN,WITH RELIEF.PT
REFUSING Q2H TURNS.REFUSED PRAFO BOOTS AS WELL.KWAME HEELS ELEVATED
W/PILLOWS.ABT AS PER ORDERS.ON TUBE FEEDING AT 50 ML/H FOR 10 HRS AS PER
ORDERS.COLOSTOMY BAG WITH SEROSANGOUIS DRAINAGE,NO BMS.TOLERATING CLEAR
LIQUIDS.PT IMPROVING SLOWLY.WILL CONT TO MONITOR PER POC.

## 2019-08-30 NOTE — NUR
FOLLOWING FOR DC PLAMNING. REFERRAL TO [facility] FOR LTAC AUTH AND DISCUSSED W
[facility] CONTACT IRIS GERMAIN TODAY TO SUPPORT LTAC DC PLAN AND
LIKELY READINESS OF 9/3/19. CREATININE ELEVATED AT PRESENT. SURGERY HAS SIGNED
OFF AND DISCUSSED WITH  WOUND CARE DR. KHANNA AGREES WITH PLAN FOR LTAC IF
[facility] AUTH CAN BE OBTAINED.

## 2019-08-31 VITALS — SYSTOLIC BLOOD PRESSURE: 126 MMHG | DIASTOLIC BLOOD PRESSURE: 75 MMHG

## 2019-08-31 VITALS — DIASTOLIC BLOOD PRESSURE: 69 MMHG | SYSTOLIC BLOOD PRESSURE: 101 MMHG

## 2019-08-31 VITALS — DIASTOLIC BLOOD PRESSURE: 56 MMHG | SYSTOLIC BLOOD PRESSURE: 120 MMHG

## 2019-08-31 VITALS — SYSTOLIC BLOOD PRESSURE: 124 MMHG | DIASTOLIC BLOOD PRESSURE: 46 MMHG

## 2019-08-31 VITALS — DIASTOLIC BLOOD PRESSURE: 72 MMHG | SYSTOLIC BLOOD PRESSURE: 138 MMHG

## 2019-08-31 VITALS — SYSTOLIC BLOOD PRESSURE: 119 MMHG | DIASTOLIC BLOOD PRESSURE: 71 MMHG

## 2019-08-31 LAB
ALBUMIN SERPL-MCNC: 2.1 G/DL (ref 3.4–5)
ANION GAP SERPL CALC-SCNC: 9 MMOL/L (ref 7–16)
BUN SERPL-MCNC: 34 MG/DL (ref 7–18)
CALCIUM SERPL-MCNC: 8.4 MG/DL (ref 8.5–10.1)
CHLORIDE SERPL-SCNC: 106 MMOL/L (ref 98–107)
CO2 SERPL-SCNC: 22 MMOL/L (ref 21–32)
CREAT SERPL-MCNC: 2.9 MG/DL (ref 0.7–1.3)
GLUCOSE SERPL-MCNC: 117 MG/DL (ref 74–106)
PHOSPHATE SERPL-MCNC: 2.3 MG/DL (ref 2.5–4.9)
POTASSIUM SERPL-SCNC: 3.8 MMOL/L (ref 3.5–5.1)
SODIUM SERPL-SCNC: 137 MMOL/L (ref 136–145)

## 2019-08-31 NOTE — NUR
RECEIVED PT'S CARE AT 1910; PT. ON BED AWAKER & ALERT; DAUGHTER AT THE BED
SIDE; DURING ASSESSMENT PT. ALERT TO DAY BUT NOT TO MONTH; ALERT TO NAME &
SITUATION; C/O BACK PAIN; ASKED IF NEED PAIN MEDICATION; AGRESS; BEFORE GIVEN
IT; PT'S REFUSED IT; HS MEDICATION GIVEN; TUBE FEEDING STARTED AT 2000;
EDUCATED ABOUT TURNING FROM SIDE TO SIDE Q2H; NO ANSWER BACK; REFUSED BOOTS;
PRN PAIN MEDICATION GIVEN CLOSE TO MIDNIGHT; PAIN RE-ASSESSMENT PT. SLEEPING;
TURNED Q2H; BOOTS APPLIED AT 0400; C/O PAIN WHEN TURNED ON BED; ABLE TO REST
THROUGH THE NIGTH WITH EYES CLOSED; MONITORING; ASSESSMENT AS CHARGED;
FOLLOWING POC; WILL PASS ON REPORT.

## 2019-08-31 NOTE — NUR
GENERALLY SOMNOLENT. ANSWERS QUESTIONS APPROPRIATELY, MAKES NEEDS KNOWN.
INCONTINENT OF URINE ONCE; USUALLY USES URINAL. WIFE ARRIVES AT BEDSIDE. ORAL
INTAKE ENCOURAGED. PT, OT, ST ON THE CASE. PEG TUBE PATENT. ST PER TELE. LARGE
SACRAL WOUND DRESSED WITH 1/2 DAKINS AND ABD. TURNED Q2H. FREQUENT CHECKS;
WILL CONTINUE TO MONITOR.

## 2019-09-01 VITALS — SYSTOLIC BLOOD PRESSURE: 124 MMHG | DIASTOLIC BLOOD PRESSURE: 62 MMHG

## 2019-09-01 VITALS — DIASTOLIC BLOOD PRESSURE: 70 MMHG | SYSTOLIC BLOOD PRESSURE: 132 MMHG

## 2019-09-01 VITALS — DIASTOLIC BLOOD PRESSURE: 70 MMHG | SYSTOLIC BLOOD PRESSURE: 134 MMHG

## 2019-09-01 VITALS — DIASTOLIC BLOOD PRESSURE: 70 MMHG | SYSTOLIC BLOOD PRESSURE: 141 MMHG

## 2019-09-01 VITALS — SYSTOLIC BLOOD PRESSURE: 124 MMHG | DIASTOLIC BLOOD PRESSURE: 64 MMHG

## 2019-09-01 VITALS — SYSTOLIC BLOOD PRESSURE: 114 MMHG | DIASTOLIC BLOOD PRESSURE: 63 MMHG

## 2019-09-01 VITALS — DIASTOLIC BLOOD PRESSURE: 58 MMHG | SYSTOLIC BLOOD PRESSURE: 116 MMHG

## 2019-09-01 VITALS — SYSTOLIC BLOOD PRESSURE: 113 MMHG | DIASTOLIC BLOOD PRESSURE: 56 MMHG

## 2019-09-01 VITALS — SYSTOLIC BLOOD PRESSURE: 112 MMHG | DIASTOLIC BLOOD PRESSURE: 52 MMHG

## 2019-09-01 VITALS — DIASTOLIC BLOOD PRESSURE: 66 MMHG | SYSTOLIC BLOOD PRESSURE: 123 MMHG

## 2019-09-01 VITALS — SYSTOLIC BLOOD PRESSURE: 141 MMHG | DIASTOLIC BLOOD PRESSURE: 69 MMHG

## 2019-09-01 VITALS — DIASTOLIC BLOOD PRESSURE: 55 MMHG | SYSTOLIC BLOOD PRESSURE: 116 MMHG

## 2019-09-01 VITALS — SYSTOLIC BLOOD PRESSURE: 129 MMHG | DIASTOLIC BLOOD PRESSURE: 76 MMHG

## 2019-09-01 VITALS — SYSTOLIC BLOOD PRESSURE: 132 MMHG | DIASTOLIC BLOOD PRESSURE: 63 MMHG

## 2019-09-01 VITALS — SYSTOLIC BLOOD PRESSURE: 137 MMHG | DIASTOLIC BLOOD PRESSURE: 68 MMHG

## 2019-09-01 VITALS — SYSTOLIC BLOOD PRESSURE: 125 MMHG | DIASTOLIC BLOOD PRESSURE: 66 MMHG

## 2019-09-01 VITALS — DIASTOLIC BLOOD PRESSURE: 61 MMHG | SYSTOLIC BLOOD PRESSURE: 128 MMHG

## 2019-09-01 VITALS — DIASTOLIC BLOOD PRESSURE: 56 MMHG | SYSTOLIC BLOOD PRESSURE: 110 MMHG

## 2019-09-01 VITALS — SYSTOLIC BLOOD PRESSURE: 122 MMHG | DIASTOLIC BLOOD PRESSURE: 51 MMHG

## 2019-09-01 VITALS — SYSTOLIC BLOOD PRESSURE: 116 MMHG | DIASTOLIC BLOOD PRESSURE: 56 MMHG

## 2019-09-01 VITALS — DIASTOLIC BLOOD PRESSURE: 54 MMHG | SYSTOLIC BLOOD PRESSURE: 118 MMHG

## 2019-09-01 VITALS — DIASTOLIC BLOOD PRESSURE: 69 MMHG | SYSTOLIC BLOOD PRESSURE: 118 MMHG

## 2019-09-01 VITALS — SYSTOLIC BLOOD PRESSURE: 125 MMHG | DIASTOLIC BLOOD PRESSURE: 60 MMHG

## 2019-09-01 VITALS — SYSTOLIC BLOOD PRESSURE: 115 MMHG | DIASTOLIC BLOOD PRESSURE: 58 MMHG

## 2019-09-01 VITALS — DIASTOLIC BLOOD PRESSURE: 59 MMHG | SYSTOLIC BLOOD PRESSURE: 117 MMHG

## 2019-09-01 VITALS — DIASTOLIC BLOOD PRESSURE: 52 MMHG | SYSTOLIC BLOOD PRESSURE: 118 MMHG

## 2019-09-01 VITALS — SYSTOLIC BLOOD PRESSURE: 111 MMHG | DIASTOLIC BLOOD PRESSURE: 64 MMHG

## 2019-09-01 VITALS — SYSTOLIC BLOOD PRESSURE: 113 MMHG | DIASTOLIC BLOOD PRESSURE: 57 MMHG

## 2019-09-01 VITALS — DIASTOLIC BLOOD PRESSURE: 63 MMHG | SYSTOLIC BLOOD PRESSURE: 109 MMHG

## 2019-09-01 VITALS — DIASTOLIC BLOOD PRESSURE: 69 MMHG | SYSTOLIC BLOOD PRESSURE: 129 MMHG

## 2019-09-01 VITALS — SYSTOLIC BLOOD PRESSURE: 115 MMHG | DIASTOLIC BLOOD PRESSURE: 73 MMHG

## 2019-09-01 VITALS — SYSTOLIC BLOOD PRESSURE: 115 MMHG | DIASTOLIC BLOOD PRESSURE: 56 MMHG

## 2019-09-01 VITALS — DIASTOLIC BLOOD PRESSURE: 60 MMHG | SYSTOLIC BLOOD PRESSURE: 115 MMHG

## 2019-09-01 VITALS — SYSTOLIC BLOOD PRESSURE: 122 MMHG | DIASTOLIC BLOOD PRESSURE: 61 MMHG

## 2019-09-01 VITALS — SYSTOLIC BLOOD PRESSURE: 128 MMHG | DIASTOLIC BLOOD PRESSURE: 66 MMHG

## 2019-09-01 VITALS — SYSTOLIC BLOOD PRESSURE: 121 MMHG | DIASTOLIC BLOOD PRESSURE: 67 MMHG

## 2019-09-01 VITALS — DIASTOLIC BLOOD PRESSURE: 58 MMHG | SYSTOLIC BLOOD PRESSURE: 123 MMHG

## 2019-09-01 VITALS — DIASTOLIC BLOOD PRESSURE: 48 MMHG | SYSTOLIC BLOOD PRESSURE: 108 MMHG

## 2019-09-01 VITALS — SYSTOLIC BLOOD PRESSURE: 135 MMHG | DIASTOLIC BLOOD PRESSURE: 69 MMHG

## 2019-09-01 VITALS — DIASTOLIC BLOOD PRESSURE: 65 MMHG | SYSTOLIC BLOOD PRESSURE: 129 MMHG

## 2019-09-01 VITALS — DIASTOLIC BLOOD PRESSURE: 62 MMHG | SYSTOLIC BLOOD PRESSURE: 121 MMHG

## 2019-09-01 VITALS — DIASTOLIC BLOOD PRESSURE: 57 MMHG | SYSTOLIC BLOOD PRESSURE: 120 MMHG

## 2019-09-01 VITALS — SYSTOLIC BLOOD PRESSURE: 136 MMHG | DIASTOLIC BLOOD PRESSURE: 76 MMHG

## 2019-09-01 VITALS — SYSTOLIC BLOOD PRESSURE: 136 MMHG | DIASTOLIC BLOOD PRESSURE: 67 MMHG

## 2019-09-01 LAB
%HYPO/RBC NFR BLD AUTO: (no result) %
ALBUMIN SERPL-MCNC: 3 G/DL (ref 3.4–5)
ANION GAP SERPL CALC-SCNC: 12 MMOL/L (ref 7–16)
ANISOCYTOSIS BLD QL SMEAR: (no result)
APTT BLD: 35.5 SECONDS (ref 24.5–32.8)
BASOPHILS NFR BLD AUTO: 0 % (ref 0–2)
BE(VIVO): -1.8 MMOL/L
BF NUCLEATED CELLS: (no result)
BF RBC: (no result)
BUN SERPL-MCNC: 36 MG/DL (ref 7–18)
CALCIUM SERPL-MCNC: 8.5 MG/DL (ref 8.5–10.1)
CHLORIDE SERPL-SCNC: 105 MMOL/L (ref 98–107)
CLARITY UR: (no result)
CO2 SERPL-SCNC: 23 MMOL/L (ref 21–32)
COLOR UR: (no result)
CREAT SERPL-MCNC: 3.1 MG/DL (ref 0.7–1.3)
EOSINOPHIL NFR BLD: 0 % (ref 0–3)
ERYTHROCYTE [DISTWIDTH] IN BLOOD BY AUTOMATED COUNT: 16.8 % (ref 10.5–14.5)
GLUCOSE SERPL-MCNC: 101 MG/DL (ref 74–106)
GRANULOCYTES NFR BLD MANUAL: 82 % (ref 36–66)
HCO3 BLD-SCNC: 22 MMOL/L (ref 22–26)
HCT VFR BLD CALC: 19.5 % (ref 42–52)
HCT VFR BLD CALC: 25.5 % (ref 42–52)
HGB BLD-MCNC: 6.4 GM/DL (ref 14–18)
HGB BLD-MCNC: 8.7 GM/DL (ref 14–18)
INR PPP: 1.3
LYMPHOCYTES NFR BLD AUTO: 9 % (ref 24–44)
MCH RBC QN AUTO: 28.8 PG (ref 26–34)
MCHC RBC AUTO-ENTMCNC: 32.9 G/DL (ref 28–37)
MCV RBC: 87.4 FL (ref 80–100)
MICROCYTES: (no result)
MONOCYTES NFR BLD: 9 % (ref 1–8)
NEUTROPHILS # BLD: 4.9 THOU/UL (ref 1.4–8.2)
NEUTS BAND NFR BLD: 0 % (ref 0–8)
PCO2 BLD: 32.9 MMHG (ref 35–45)
PHOSPHATE SERPL-MCNC: 2.1 MG/DL (ref 2.5–4.9)
PLATELET # BLD: 254 THOU/UL (ref 150–400)
PO2 BLD: 58.2 MMHG (ref 80–100)
POLYCHROMASIA BLD QL SMEAR: (no result)
POTASSIUM SERPL-SCNC: 3.8 MMOL/L (ref 3.5–5.1)
PROTHROMBIN TIME: 14 SECONDS (ref 9.3–11.4)
RBC # BLD AUTO: 2.24 MIL/UL (ref 4.5–6)
SODIUM SERPL-SCNC: 140 MMOL/L (ref 136–145)
SOURCE: (no result)
SPECIMEN VOL 24H UR: 60 ML
WBC # BLD AUTO: 6 THOU/UL (ref 4–11)

## 2019-09-01 NOTE — NUR
ASSUMED PT'S CARE AT 1910; PT. ON BED; RESTING WITH EYES CLOSED; SLEEP
INTERRUPTED DURING SHIFT CHANGED; ALERT; AOX3; AROUND 2155; PT. ON BED; HAD
AUDIBLE WHEEZING WITHOUT STHESTOCOPE; O2 SAT 87% ON 3L; O2 INCREASED TO 4L; NP
NOTIFIED; ORDERS RECEIVED; AT AROUND 0030; CALLED TO PHYSICIAN WITH X-RAY
RESULTS, HR ON THE 130s & PEG TUBE RESIDUAL (60 ML, GREEN COLOR MOSTLY);
ORDERS RECEIVED; MONITORING HR; IV LASIX GIVEN; AT 0100 VS TAKEN; TEMPERATURE
ELEVATED, RESPIRATIONS 26/MIN; 02 SAT 89%; O2 INCREASE TO 6L; PHYSICIAN
NOTIFIED; ORDERS RECEIVED; DR. POTTER CONSULT; ORDERS RECEIVED; ORDER TO BE
TRANSFER TO ICU;  NURSE, HOUSE SUPERVISOR & ICU CHARGED NOTIFIED; ALDRICH
INSERTED; LABS WITHDRAW; PT. TRANSFER TO ICU AROUND 0300; PT'S WIFE NOTIFIED
AT 0310;

## 2019-09-01 NOTE — NUR
PATIENT CONFUSED DURING THE BEGINNING OF THE SHIFT, MORE ALERT THIS EVENING.
NORMAL SINUS RHYTHM ON TELEMETRY MONITOR. ON 3L NASAL CANNULA, O2 SAT REMAINED
ABOVE 90%. PATIENT NPO, PEG TUBE IN PLACE. OSTOMY INTACT. ALDRICH PATENT AND
DRAINING ADEQUATE OUTPUT. WOUND CARE TO COCCYX COMPLETE. FAMILY AND PATIENT
UPDATED ON THE PLAN OF CARE. NO SIGNS OF ACUTE DISTRESS NOTED AT THIS TIME.
WILL CONTINUE TO MONITOR.

## 2019-09-02 VITALS — DIASTOLIC BLOOD PRESSURE: 61 MMHG | SYSTOLIC BLOOD PRESSURE: 128 MMHG

## 2019-09-02 VITALS — SYSTOLIC BLOOD PRESSURE: 116 MMHG | DIASTOLIC BLOOD PRESSURE: 52 MMHG

## 2019-09-02 VITALS — SYSTOLIC BLOOD PRESSURE: 127 MMHG | DIASTOLIC BLOOD PRESSURE: 58 MMHG

## 2019-09-02 VITALS — DIASTOLIC BLOOD PRESSURE: 61 MMHG | SYSTOLIC BLOOD PRESSURE: 127 MMHG

## 2019-09-02 VITALS — SYSTOLIC BLOOD PRESSURE: 134 MMHG | DIASTOLIC BLOOD PRESSURE: 61 MMHG

## 2019-09-02 VITALS — DIASTOLIC BLOOD PRESSURE: 47 MMHG | SYSTOLIC BLOOD PRESSURE: 98 MMHG

## 2019-09-02 VITALS — DIASTOLIC BLOOD PRESSURE: 62 MMHG | SYSTOLIC BLOOD PRESSURE: 133 MMHG

## 2019-09-02 VITALS — DIASTOLIC BLOOD PRESSURE: 59 MMHG | SYSTOLIC BLOOD PRESSURE: 136 MMHG

## 2019-09-02 VITALS — SYSTOLIC BLOOD PRESSURE: 112 MMHG | DIASTOLIC BLOOD PRESSURE: 52 MMHG

## 2019-09-02 VITALS — SYSTOLIC BLOOD PRESSURE: 117 MMHG | DIASTOLIC BLOOD PRESSURE: 57 MMHG

## 2019-09-02 VITALS — SYSTOLIC BLOOD PRESSURE: 121 MMHG | DIASTOLIC BLOOD PRESSURE: 54 MMHG

## 2019-09-02 VITALS — DIASTOLIC BLOOD PRESSURE: 57 MMHG | SYSTOLIC BLOOD PRESSURE: 121 MMHG

## 2019-09-02 VITALS — DIASTOLIC BLOOD PRESSURE: 60 MMHG | SYSTOLIC BLOOD PRESSURE: 132 MMHG

## 2019-09-02 VITALS — SYSTOLIC BLOOD PRESSURE: 117 MMHG | DIASTOLIC BLOOD PRESSURE: 52 MMHG

## 2019-09-02 VITALS — SYSTOLIC BLOOD PRESSURE: 119 MMHG | DIASTOLIC BLOOD PRESSURE: 58 MMHG

## 2019-09-02 VITALS — DIASTOLIC BLOOD PRESSURE: 58 MMHG | SYSTOLIC BLOOD PRESSURE: 120 MMHG

## 2019-09-02 VITALS — SYSTOLIC BLOOD PRESSURE: 119 MMHG | DIASTOLIC BLOOD PRESSURE: 55 MMHG

## 2019-09-02 VITALS — DIASTOLIC BLOOD PRESSURE: 61 MMHG | SYSTOLIC BLOOD PRESSURE: 126 MMHG

## 2019-09-02 VITALS — SYSTOLIC BLOOD PRESSURE: 108 MMHG | DIASTOLIC BLOOD PRESSURE: 51 MMHG

## 2019-09-02 VITALS — SYSTOLIC BLOOD PRESSURE: 127 MMHG | DIASTOLIC BLOOD PRESSURE: 60 MMHG

## 2019-09-02 VITALS — SYSTOLIC BLOOD PRESSURE: 129 MMHG | DIASTOLIC BLOOD PRESSURE: 61 MMHG

## 2019-09-02 VITALS — DIASTOLIC BLOOD PRESSURE: 57 MMHG | SYSTOLIC BLOOD PRESSURE: 132 MMHG

## 2019-09-02 VITALS — DIASTOLIC BLOOD PRESSURE: 59 MMHG | SYSTOLIC BLOOD PRESSURE: 126 MMHG

## 2019-09-02 VITALS — DIASTOLIC BLOOD PRESSURE: 57 MMHG | SYSTOLIC BLOOD PRESSURE: 124 MMHG

## 2019-09-02 VITALS — DIASTOLIC BLOOD PRESSURE: 58 MMHG | SYSTOLIC BLOOD PRESSURE: 106 MMHG

## 2019-09-02 VITALS — DIASTOLIC BLOOD PRESSURE: 66 MMHG | SYSTOLIC BLOOD PRESSURE: 134 MMHG

## 2019-09-02 VITALS — SYSTOLIC BLOOD PRESSURE: 123 MMHG | DIASTOLIC BLOOD PRESSURE: 59 MMHG

## 2019-09-02 VITALS — DIASTOLIC BLOOD PRESSURE: 57 MMHG | SYSTOLIC BLOOD PRESSURE: 131 MMHG

## 2019-09-02 VITALS — SYSTOLIC BLOOD PRESSURE: 130 MMHG | DIASTOLIC BLOOD PRESSURE: 61 MMHG

## 2019-09-02 VITALS — SYSTOLIC BLOOD PRESSURE: 131 MMHG | DIASTOLIC BLOOD PRESSURE: 53 MMHG

## 2019-09-02 VITALS — SYSTOLIC BLOOD PRESSURE: 134 MMHG | DIASTOLIC BLOOD PRESSURE: 60 MMHG

## 2019-09-02 VITALS — SYSTOLIC BLOOD PRESSURE: 131 MMHG | DIASTOLIC BLOOD PRESSURE: 61 MMHG

## 2019-09-02 VITALS — DIASTOLIC BLOOD PRESSURE: 62 MMHG | SYSTOLIC BLOOD PRESSURE: 131 MMHG

## 2019-09-02 VITALS — SYSTOLIC BLOOD PRESSURE: 128 MMHG | DIASTOLIC BLOOD PRESSURE: 58 MMHG

## 2019-09-02 VITALS — DIASTOLIC BLOOD PRESSURE: 58 MMHG | SYSTOLIC BLOOD PRESSURE: 123 MMHG

## 2019-09-02 VITALS — SYSTOLIC BLOOD PRESSURE: 115 MMHG | DIASTOLIC BLOOD PRESSURE: 56 MMHG

## 2019-09-02 VITALS — SYSTOLIC BLOOD PRESSURE: 129 MMHG | DIASTOLIC BLOOD PRESSURE: 60 MMHG

## 2019-09-02 LAB
ALBUMIN FLD-MCNC: 1.6 G/DL
ALBUMIN SERPL-MCNC: 3 G/DL (ref 3.4–5)
AMYLASE FLD-CCNC: 198 U/L
ANION GAP SERPL CALC-SCNC: 13 MMOL/L (ref 7–16)
APTT BLD: 36 SECONDS (ref 24.5–32.8)
BODY FLUID LDH: (no result) IU/L
BUN SERPL-MCNC: 34 MG/DL (ref 7–18)
CALCIUM SERPL-MCNC: 8.6 MG/DL (ref 8.5–10.1)
CHLORIDE SERPL-SCNC: 106 MMOL/L (ref 98–107)
CO2 SERPL-SCNC: 24 MMOL/L (ref 21–32)
CREAT SERPL-MCNC: 3.2 MG/DL (ref 0.7–1.3)
ERYTHROCYTE [DISTWIDTH] IN BLOOD BY AUTOMATED COUNT: 17.3 % (ref 10.5–14.5)
GLUCOSE FLD-MCNC: 13 MG/DL
GLUCOSE SERPL-MCNC: 117 MG/DL (ref 74–106)
HCT VFR BLD CALC: 24.3 % (ref 42–52)
HGB BLD-MCNC: 8.2 GM/DL (ref 14–18)
INR PPP: 1.4
MCH RBC QN AUTO: 29 PG (ref 26–34)
MCHC RBC AUTO-ENTMCNC: 33.7 G/DL (ref 28–37)
MCV RBC: 86 FL (ref 80–100)
PHOSPHATE SERPL-MCNC: 2.8 MG/DL (ref 2.5–4.9)
PLATELET # BLD: 189 THOU/UL (ref 150–400)
POTASSIUM SERPL-SCNC: 3.4 MMOL/L (ref 3.5–5.1)
PROT FLD-MCNC: 4 G/DL
PROTHROMBIN TIME: 14.7 SECONDS (ref 9.3–11.4)
RBC # BLD AUTO: 2.82 MIL/UL (ref 4.5–6)
SODIUM SERPL-SCNC: 143 MMOL/L (ref 136–145)
WBC # BLD AUTO: 6.9 THOU/UL (ref 4–11)

## 2019-09-02 NOTE — NUR
0700 ASSUMED PT CARE- PT ALERT AND ORIENTED X4. AFEBRILE. VITAL SIGNS STABLE.
NO COMPLAINTS OF PAIN.

## 2019-09-02 NOTE — NUR
0953 PT , APPERARS TO BE IN SVT. BP 98/47, INSTRUCTED PT TO COUGH AND
BARE DOWN- NO CHANGE IN HR OR RHYTHM. STAT EKG OBTAINED.
DR BERNAL PAGED FOR ORDERS.
ORDERED TO GIVE NS 500ML BOLUS X1 AND METOPROLOL SUCCINATE 25MG X1-CONSULT
CARDIOLOGY.
1000 PT HR SLOWED BACK DOWN WITHOUT BOLUS AND BETA BLOCKER. BP REMAINED
STABLE. ORDERS CARRIED OUT.
CARDIOLOGY CONSULT CALLED.

## 2019-09-02 NOTE — NUR
OT SERVICES PLACED ON HOLD AS Pt TRANSFERED FROM CCU TO ICU. WHEN APPROPRIATE,
PLEASE PLACE REORDER/ RESUME ORDERS. THANK YOU

## 2019-09-02 NOTE — NUR
ASSUMED CARE OF PATIENT AT 1900. VS AFEBRILE. ORIENTED TO PERSON, DOES NOT
REALLY ANSWER ANY OTHER QUESTIONS. TURNED Q2, PROFO BOOTS IN PLACE. WOUND
DRESSING C/D/I.  NO OTHER S/S OF DISTRESS, WILL CONTINUE TO MONITOR.

## 2019-09-03 VITALS — DIASTOLIC BLOOD PRESSURE: 59 MMHG | SYSTOLIC BLOOD PRESSURE: 126 MMHG

## 2019-09-03 VITALS — SYSTOLIC BLOOD PRESSURE: 140 MMHG | DIASTOLIC BLOOD PRESSURE: 71 MMHG

## 2019-09-03 VITALS — DIASTOLIC BLOOD PRESSURE: 59 MMHG | SYSTOLIC BLOOD PRESSURE: 124 MMHG

## 2019-09-03 VITALS — SYSTOLIC BLOOD PRESSURE: 137 MMHG | DIASTOLIC BLOOD PRESSURE: 70 MMHG

## 2019-09-03 VITALS — SYSTOLIC BLOOD PRESSURE: 131 MMHG | DIASTOLIC BLOOD PRESSURE: 59 MMHG

## 2019-09-03 VITALS — DIASTOLIC BLOOD PRESSURE: 60 MMHG | SYSTOLIC BLOOD PRESSURE: 128 MMHG

## 2019-09-03 VITALS — SYSTOLIC BLOOD PRESSURE: 127 MMHG | DIASTOLIC BLOOD PRESSURE: 60 MMHG

## 2019-09-03 VITALS — DIASTOLIC BLOOD PRESSURE: 69 MMHG | SYSTOLIC BLOOD PRESSURE: 143 MMHG

## 2019-09-03 VITALS — DIASTOLIC BLOOD PRESSURE: 69 MMHG | SYSTOLIC BLOOD PRESSURE: 141 MMHG

## 2019-09-03 VITALS — SYSTOLIC BLOOD PRESSURE: 142 MMHG | DIASTOLIC BLOOD PRESSURE: 70 MMHG

## 2019-09-03 VITALS — DIASTOLIC BLOOD PRESSURE: 72 MMHG | SYSTOLIC BLOOD PRESSURE: 146 MMHG

## 2019-09-03 VITALS — DIASTOLIC BLOOD PRESSURE: 61 MMHG | SYSTOLIC BLOOD PRESSURE: 132 MMHG

## 2019-09-03 VITALS — DIASTOLIC BLOOD PRESSURE: 64 MMHG | SYSTOLIC BLOOD PRESSURE: 130 MMHG

## 2019-09-03 VITALS — SYSTOLIC BLOOD PRESSURE: 122 MMHG | DIASTOLIC BLOOD PRESSURE: 67 MMHG

## 2019-09-03 VITALS — SYSTOLIC BLOOD PRESSURE: 137 MMHG | DIASTOLIC BLOOD PRESSURE: 69 MMHG

## 2019-09-03 VITALS — DIASTOLIC BLOOD PRESSURE: 59 MMHG | SYSTOLIC BLOOD PRESSURE: 129 MMHG

## 2019-09-03 VITALS — SYSTOLIC BLOOD PRESSURE: 117 MMHG | DIASTOLIC BLOOD PRESSURE: 60 MMHG

## 2019-09-03 VITALS — SYSTOLIC BLOOD PRESSURE: 147 MMHG | DIASTOLIC BLOOD PRESSURE: 75 MMHG

## 2019-09-03 VITALS — DIASTOLIC BLOOD PRESSURE: 64 MMHG | SYSTOLIC BLOOD PRESSURE: 126 MMHG

## 2019-09-03 VITALS — SYSTOLIC BLOOD PRESSURE: 126 MMHG | DIASTOLIC BLOOD PRESSURE: 62 MMHG

## 2019-09-03 LAB
ALBUMIN SERPL-MCNC: 3.8 G/DL (ref 3.4–5)
ANION GAP SERPL CALC-SCNC: 13 MMOL/L (ref 7–16)
BUN SERPL-MCNC: 33 MG/DL (ref 7–18)
CALCIUM SERPL-MCNC: 8.7 MG/DL (ref 8.5–10.1)
CHLORIDE SERPL-SCNC: 104 MMOL/L (ref 98–107)
CO2 SERPL-SCNC: 24 MMOL/L (ref 21–32)
CREAT SERPL-MCNC: 3.3 MG/DL (ref 0.7–1.3)
GLUCOSE SERPL-MCNC: 150 MG/DL (ref 74–106)
PHOSPHATE SERPL-MCNC: 1.7 MG/DL (ref 2.5–4.9)
POTASSIUM SERPL-SCNC: 3.1 MMOL/L (ref 3.5–5.1)
SODIUM SERPL-SCNC: 141 MMOL/L (ref 136–145)
SOURCE: (no result)

## 2019-09-03 NOTE — NUR
Pt was given a CHG bath, please note, he had a great deal of discomfort when
turning for a linen change. Pt denies pain, and as long as he is still, this
MAY be true, and may be why he declines turns. Pt may benefit from a fentanyl
patch, will try to offer prn med. Will continue to monitor

## 2019-09-03 NOTE — NUR
PT TRANSFERRED FROM CCU TO ICU. DUE TO CHANGE IN MEDICAL STATUS, WILL PLACE PT
ON HOLD FROM P.T. SERVICES.  PLEASE RE-ORDER THERAPY WHEN INDICATED.  THANK
YOU.

## 2019-09-04 VITALS — SYSTOLIC BLOOD PRESSURE: 99 MMHG | DIASTOLIC BLOOD PRESSURE: 54 MMHG

## 2019-09-04 VITALS — DIASTOLIC BLOOD PRESSURE: 59 MMHG | SYSTOLIC BLOOD PRESSURE: 91 MMHG

## 2019-09-04 VITALS — DIASTOLIC BLOOD PRESSURE: 59 MMHG | SYSTOLIC BLOOD PRESSURE: 101 MMHG

## 2019-09-04 VITALS — DIASTOLIC BLOOD PRESSURE: 47 MMHG | SYSTOLIC BLOOD PRESSURE: 102 MMHG

## 2019-09-04 VITALS — SYSTOLIC BLOOD PRESSURE: 80 MMHG | DIASTOLIC BLOOD PRESSURE: 42 MMHG

## 2019-09-04 VITALS — DIASTOLIC BLOOD PRESSURE: 45 MMHG | SYSTOLIC BLOOD PRESSURE: 82 MMHG

## 2019-09-04 VITALS — DIASTOLIC BLOOD PRESSURE: 50 MMHG | SYSTOLIC BLOOD PRESSURE: 98 MMHG

## 2019-09-04 VITALS — SYSTOLIC BLOOD PRESSURE: 92 MMHG | DIASTOLIC BLOOD PRESSURE: 50 MMHG

## 2019-09-04 VITALS — DIASTOLIC BLOOD PRESSURE: 43 MMHG | SYSTOLIC BLOOD PRESSURE: 99 MMHG

## 2019-09-04 VITALS — DIASTOLIC BLOOD PRESSURE: 52 MMHG | SYSTOLIC BLOOD PRESSURE: 95 MMHG

## 2019-09-04 VITALS — DIASTOLIC BLOOD PRESSURE: 59 MMHG | SYSTOLIC BLOOD PRESSURE: 104 MMHG

## 2019-09-04 VITALS — DIASTOLIC BLOOD PRESSURE: 64 MMHG | SYSTOLIC BLOOD PRESSURE: 123 MMHG

## 2019-09-04 VITALS — DIASTOLIC BLOOD PRESSURE: 67 MMHG | SYSTOLIC BLOOD PRESSURE: 114 MMHG

## 2019-09-04 VITALS — DIASTOLIC BLOOD PRESSURE: 54 MMHG | SYSTOLIC BLOOD PRESSURE: 96 MMHG

## 2019-09-04 VITALS — DIASTOLIC BLOOD PRESSURE: 50 MMHG | SYSTOLIC BLOOD PRESSURE: 102 MMHG

## 2019-09-04 VITALS — DIASTOLIC BLOOD PRESSURE: 64 MMHG | SYSTOLIC BLOOD PRESSURE: 109 MMHG

## 2019-09-04 VITALS — DIASTOLIC BLOOD PRESSURE: 45 MMHG | SYSTOLIC BLOOD PRESSURE: 87 MMHG

## 2019-09-04 VITALS — SYSTOLIC BLOOD PRESSURE: 100 MMHG | DIASTOLIC BLOOD PRESSURE: 56 MMHG

## 2019-09-04 VITALS — SYSTOLIC BLOOD PRESSURE: 111 MMHG | DIASTOLIC BLOOD PRESSURE: 58 MMHG

## 2019-09-04 VITALS — DIASTOLIC BLOOD PRESSURE: 55 MMHG | SYSTOLIC BLOOD PRESSURE: 102 MMHG

## 2019-09-04 VITALS — DIASTOLIC BLOOD PRESSURE: 51 MMHG | SYSTOLIC BLOOD PRESSURE: 98 MMHG

## 2019-09-04 VITALS — DIASTOLIC BLOOD PRESSURE: 65 MMHG | SYSTOLIC BLOOD PRESSURE: 131 MMHG

## 2019-09-04 VITALS — DIASTOLIC BLOOD PRESSURE: 54 MMHG | SYSTOLIC BLOOD PRESSURE: 100 MMHG

## 2019-09-04 VITALS — SYSTOLIC BLOOD PRESSURE: 116 MMHG | DIASTOLIC BLOOD PRESSURE: 66 MMHG

## 2019-09-04 VITALS — SYSTOLIC BLOOD PRESSURE: 95 MMHG | DIASTOLIC BLOOD PRESSURE: 49 MMHG

## 2019-09-04 VITALS — SYSTOLIC BLOOD PRESSURE: 99 MMHG | DIASTOLIC BLOOD PRESSURE: 51 MMHG

## 2019-09-04 VITALS — DIASTOLIC BLOOD PRESSURE: 49 MMHG | SYSTOLIC BLOOD PRESSURE: 98 MMHG

## 2019-09-04 VITALS — SYSTOLIC BLOOD PRESSURE: 104 MMHG | DIASTOLIC BLOOD PRESSURE: 58 MMHG

## 2019-09-04 VITALS — DIASTOLIC BLOOD PRESSURE: 41 MMHG | SYSTOLIC BLOOD PRESSURE: 82 MMHG

## 2019-09-04 VITALS — DIASTOLIC BLOOD PRESSURE: 59 MMHG | SYSTOLIC BLOOD PRESSURE: 98 MMHG

## 2019-09-04 VITALS — SYSTOLIC BLOOD PRESSURE: 92 MMHG | DIASTOLIC BLOOD PRESSURE: 48 MMHG

## 2019-09-04 VITALS — DIASTOLIC BLOOD PRESSURE: 46 MMHG | SYSTOLIC BLOOD PRESSURE: 85 MMHG

## 2019-09-04 VITALS — SYSTOLIC BLOOD PRESSURE: 98 MMHG | DIASTOLIC BLOOD PRESSURE: 56 MMHG

## 2019-09-04 VITALS — DIASTOLIC BLOOD PRESSURE: 42 MMHG | SYSTOLIC BLOOD PRESSURE: 94 MMHG

## 2019-09-04 VITALS — SYSTOLIC BLOOD PRESSURE: 96 MMHG | DIASTOLIC BLOOD PRESSURE: 55 MMHG

## 2019-09-04 VITALS — SYSTOLIC BLOOD PRESSURE: 106 MMHG | DIASTOLIC BLOOD PRESSURE: 58 MMHG

## 2019-09-04 VITALS — DIASTOLIC BLOOD PRESSURE: 62 MMHG | SYSTOLIC BLOOD PRESSURE: 92 MMHG

## 2019-09-04 VITALS — SYSTOLIC BLOOD PRESSURE: 100 MMHG | DIASTOLIC BLOOD PRESSURE: 49 MMHG

## 2019-09-04 VITALS — DIASTOLIC BLOOD PRESSURE: 62 MMHG | SYSTOLIC BLOOD PRESSURE: 106 MMHG

## 2019-09-04 VITALS — SYSTOLIC BLOOD PRESSURE: 128 MMHG | DIASTOLIC BLOOD PRESSURE: 61 MMHG

## 2019-09-04 VITALS — SYSTOLIC BLOOD PRESSURE: 116 MMHG | DIASTOLIC BLOOD PRESSURE: 68 MMHG

## 2019-09-04 VITALS — SYSTOLIC BLOOD PRESSURE: 97 MMHG | DIASTOLIC BLOOD PRESSURE: 48 MMHG

## 2019-09-04 VITALS — SYSTOLIC BLOOD PRESSURE: 103 MMHG | DIASTOLIC BLOOD PRESSURE: 48 MMHG

## 2019-09-04 VITALS — SYSTOLIC BLOOD PRESSURE: 95 MMHG | DIASTOLIC BLOOD PRESSURE: 58 MMHG

## 2019-09-04 LAB
ALBUMIN SERPL-MCNC: 3 G/DL (ref 3.4–5)
ANION GAP SERPL CALC-SCNC: 10 MMOL/L (ref 7–16)
BE(VIVO): 0.7 MMOL/L
BUN SERPL-MCNC: 33 MG/DL (ref 7–18)
CALCIUM SERPL-MCNC: 8.4 MG/DL (ref 8.5–10.1)
CHLORIDE SERPL-SCNC: 106 MMOL/L (ref 98–107)
CO2 SERPL-SCNC: 26 MMOL/L (ref 21–32)
CREAT SERPL-MCNC: 3.4 MG/DL (ref 0.7–1.3)
ERYTHROCYTE [DISTWIDTH] IN BLOOD BY AUTOMATED COUNT: 18.2 % (ref 10.5–14.5)
GLUCOSE SERPL-MCNC: 142 MG/DL (ref 74–106)
HCO3 BLD-SCNC: 24.2 MMOL/L (ref 22–26)
HCT VFR BLD CALC: 27.2 % (ref 42–52)
HGB BLD-MCNC: 8.8 GM/DL (ref 14–18)
MCH RBC QN AUTO: 28.6 PG (ref 26–34)
MCHC RBC AUTO-ENTMCNC: 32.2 G/DL (ref 28–37)
MCV RBC: 88.9 FL (ref 80–100)
PCO2 BLD: 34.6 MMHG (ref 35–45)
PHOSPHATE SERPL-MCNC: 1.2 MG/DL (ref 2.5–4.9)
PLATELET # BLD: 204 THOU/UL (ref 150–400)
PO2 BLD: 71.6 MMHG (ref 80–100)
POTASSIUM SERPL-SCNC: 3.1 MMOL/L (ref 3.5–5.1)
RBC # BLD AUTO: 3.06 MIL/UL (ref 4.5–6)
SODIUM SERPL-SCNC: 142 MMOL/L (ref 136–145)
WBC # BLD AUTO: 6.4 THOU/UL (ref 4–11)

## 2019-09-04 NOTE — NUR
Pt went into SVT with heart rate 170's approx 0953.  Pt sleeping at the time
of the rhythm change. BP stable.  Call was placed to Dr Calderon's offict
and spoke with his nurse, Esmer.  Orders were received to replace electrolytes
as per Dr Aviles's orders and obtain an ABG.  Replacment medications were
started and ABG was obtained by RT. Call was placed back to Dr Calderon's
office and Dr Calderon paged.  At 1105 another call placed to Dr Olvera's
office and physician paged. SVT continues. Copy of rhythm strip faxed to
Dr Roe's office. Systolic BP dropped into 90's.
Pt asymptomatic.  At approximately 1145 Dr Calderon here to see
patient.  Orders initially given for 12 mg of Adenosine.  Dr Calderon stayed
at bedside while preparation made to give the Adenosine.  Pt broke out
of the SVT just as administration of Adenosine about to occur.  Adenosine
order cancelled by Dr Calderon.  Converted back to sinus tachycardia wih
rate 100's.

## 2019-09-04 NOTE — NUR
END OF SHIFT NOTE.
ASSUMED CARE FOR PATIENT AT APPROX 1900 ON 9/3, UPON ARRIVAL PATIENT WAS
ASSESSED AND VITALS TAKEN PER ICU PROTOCOL. PATIENT WAS A&O X4, SATING > 94%
ON 4L NC, OXYGEN NEEDS NOW TITRATED DOWN TO 2L NC. PATIENT DENIED PAIN ALL
NIGHT, REFUSING ANY PAIN MEDICATIONS. PATIENT REFUSED TURNS AFTER APPROX 0000
BECAUSE HE DID NOT WANT TO BE WOKEN. TUBE FEEDS WERE STARTED AT 30ML/HR. NOW
INCREASED TO 40ML/HR AS PATIENT HAS BEEN TOLERATING THEM WELL. HEEL BOOTS
APPLIED BILATERALLY. NO SIGNIFICANT EVENTS OVERNIGHT. PATIENT REMAINS MED/SURG
TELE STATUS.

## 2019-09-04 NOTE — NUR
FAXED CLINICAL UPDATE TO [facility] SPOKE WITH SIRENA IN ADM SHE RECEIVED
UPDATE AND REQUESTED CURRENT LABS AND PROG. NOTES WHICH I FAXED AND RECEIVED
CONFIRMATION. DCP TO FOLLOW.

## 2019-09-04 NOTE — NUR
No further episodes of SVT.  Blood pressure marginal. Wound care was completed
and pictures were obtained of wounds.  Tube feeding increased to goal
rate of 50 ml/hr.  Wife and daughter by to visit.  Daughter wanting copy
of living will witnessed. Discussed with Neda, case mamager. Notary
unavailable this evening and pt has been receiving pain medications.  Wife
will come by in the morning to address getting papers witnessed. Dr Herbert was consulted today for evaluation of Lymphoma.  Report given
to RN assuming care.

## 2019-09-04 NOTE — NUR
FOLLOWING FOR DC PLANNING. CLINICAL INFO REVIEWED. PT TRANSFERRED TO ICU OVER
HOLIDAY WEEKEND FOR RESP DISTRESS AND HAD LEFT PLEURAL EFFUSION AND
THORACENTESIS WITH 1.3 LITERS REMOVED. PT AND SPOUSE DECIDED ON DNR OVER
WEEKEND. PT IS ROUGHLY 6 DAYS POST SACRAL WOUND DEBRIDEMENT AND DIVERTING
COLOSTOMY. WET TO DRY DRSGS TO SACRAL WOUND. DC PLAN HAS BEEN LTAC AND PT AND
SPOUSE CHOSE [facility] PT SLEEPING WHEN SEEN AND CALLED SPOUSE
RUBENS TO CONFIRM DC PLANS. SHE INDICATES PT AND SHE STILL WANT AGGRESSIVE
CARE. COMMUNICATED WITH DR. BARRERA THIS AM WHO CONFIRMS DC PLAN TO LTAC AS
WELL. SPOKE WITH [employer] SPRING CONTACT AND PROVIDED CLINICAL SUPPORT FOR
LTAC AUTH PROCESS AND HEARD FROM DICK AT [facility] IN AFTERNOON THAT [employer]
GAVE AUTH FOR LTAC. INFORMED SPOUSE OF INSURANCE AUTH FOR [facility] AND PT
COULD TRANSITION THERE AS EARLY AS TOMORROW. DR. MOMIN CONSULTED R/T
LYMPHOMA AND WHETHER ANY FURTHER TREATMENT CAN BE CONSIDERED. PER DR. MOMIN' NOTE TODAY, CURRENTLY TOO DEBILITATED FOR CHEMO. RN UPDATED.

## 2019-09-05 VITALS — SYSTOLIC BLOOD PRESSURE: 109 MMHG | DIASTOLIC BLOOD PRESSURE: 48 MMHG

## 2019-09-05 VITALS — SYSTOLIC BLOOD PRESSURE: 127 MMHG | DIASTOLIC BLOOD PRESSURE: 62 MMHG

## 2019-09-05 VITALS — DIASTOLIC BLOOD PRESSURE: 47 MMHG | SYSTOLIC BLOOD PRESSURE: 97 MMHG

## 2019-09-05 VITALS — SYSTOLIC BLOOD PRESSURE: 95 MMHG | DIASTOLIC BLOOD PRESSURE: 47 MMHG

## 2019-09-05 VITALS — DIASTOLIC BLOOD PRESSURE: 53 MMHG | SYSTOLIC BLOOD PRESSURE: 118 MMHG

## 2019-09-05 VITALS — DIASTOLIC BLOOD PRESSURE: 50 MMHG | SYSTOLIC BLOOD PRESSURE: 109 MMHG

## 2019-09-05 VITALS — DIASTOLIC BLOOD PRESSURE: 51 MMHG | SYSTOLIC BLOOD PRESSURE: 100 MMHG

## 2019-09-05 VITALS — SYSTOLIC BLOOD PRESSURE: 110 MMHG | DIASTOLIC BLOOD PRESSURE: 53 MMHG

## 2019-09-05 VITALS — DIASTOLIC BLOOD PRESSURE: 57 MMHG | SYSTOLIC BLOOD PRESSURE: 116 MMHG

## 2019-09-05 VITALS — SYSTOLIC BLOOD PRESSURE: 116 MMHG | DIASTOLIC BLOOD PRESSURE: 52 MMHG

## 2019-09-05 VITALS — DIASTOLIC BLOOD PRESSURE: 50 MMHG | SYSTOLIC BLOOD PRESSURE: 103 MMHG

## 2019-09-05 VITALS — DIASTOLIC BLOOD PRESSURE: 51 MMHG | SYSTOLIC BLOOD PRESSURE: 105 MMHG

## 2019-09-05 VITALS — DIASTOLIC BLOOD PRESSURE: 52 MMHG | SYSTOLIC BLOOD PRESSURE: 111 MMHG

## 2019-09-05 VITALS — SYSTOLIC BLOOD PRESSURE: 111 MMHG | DIASTOLIC BLOOD PRESSURE: 50 MMHG

## 2019-09-05 VITALS — DIASTOLIC BLOOD PRESSURE: 52 MMHG | SYSTOLIC BLOOD PRESSURE: 114 MMHG

## 2019-09-05 VITALS — SYSTOLIC BLOOD PRESSURE: 116 MMHG | DIASTOLIC BLOOD PRESSURE: 53 MMHG

## 2019-09-05 VITALS — SYSTOLIC BLOOD PRESSURE: 102 MMHG | DIASTOLIC BLOOD PRESSURE: 50 MMHG

## 2019-09-05 VITALS — SYSTOLIC BLOOD PRESSURE: 112 MMHG | DIASTOLIC BLOOD PRESSURE: 53 MMHG

## 2019-09-05 VITALS — DIASTOLIC BLOOD PRESSURE: 57 MMHG | SYSTOLIC BLOOD PRESSURE: 115 MMHG

## 2019-09-05 VITALS — DIASTOLIC BLOOD PRESSURE: 40 MMHG | SYSTOLIC BLOOD PRESSURE: 95 MMHG

## 2019-09-05 VITALS — SYSTOLIC BLOOD PRESSURE: 104 MMHG | DIASTOLIC BLOOD PRESSURE: 49 MMHG

## 2019-09-05 VITALS — SYSTOLIC BLOOD PRESSURE: 105 MMHG | DIASTOLIC BLOOD PRESSURE: 52 MMHG

## 2019-09-05 VITALS — SYSTOLIC BLOOD PRESSURE: 114 MMHG | DIASTOLIC BLOOD PRESSURE: 54 MMHG

## 2019-09-05 VITALS — SYSTOLIC BLOOD PRESSURE: 111 MMHG | DIASTOLIC BLOOD PRESSURE: 56 MMHG

## 2019-09-05 VITALS — DIASTOLIC BLOOD PRESSURE: 53 MMHG | SYSTOLIC BLOOD PRESSURE: 111 MMHG

## 2019-09-05 VITALS — DIASTOLIC BLOOD PRESSURE: 51 MMHG | SYSTOLIC BLOOD PRESSURE: 102 MMHG

## 2019-09-05 VITALS — SYSTOLIC BLOOD PRESSURE: 118 MMHG | DIASTOLIC BLOOD PRESSURE: 51 MMHG

## 2019-09-05 VITALS — SYSTOLIC BLOOD PRESSURE: 114 MMHG | DIASTOLIC BLOOD PRESSURE: 52 MMHG

## 2019-09-05 VITALS — SYSTOLIC BLOOD PRESSURE: 121 MMHG | DIASTOLIC BLOOD PRESSURE: 64 MMHG

## 2019-09-05 VITALS — SYSTOLIC BLOOD PRESSURE: 113 MMHG | DIASTOLIC BLOOD PRESSURE: 54 MMHG

## 2019-09-05 VITALS — DIASTOLIC BLOOD PRESSURE: 52 MMHG | SYSTOLIC BLOOD PRESSURE: 88 MMHG

## 2019-09-05 VITALS — DIASTOLIC BLOOD PRESSURE: 48 MMHG | SYSTOLIC BLOOD PRESSURE: 102 MMHG

## 2019-09-05 VITALS — DIASTOLIC BLOOD PRESSURE: 49 MMHG | SYSTOLIC BLOOD PRESSURE: 105 MMHG

## 2019-09-05 VITALS — DIASTOLIC BLOOD PRESSURE: 47 MMHG | SYSTOLIC BLOOD PRESSURE: 100 MMHG

## 2019-09-05 VITALS — DIASTOLIC BLOOD PRESSURE: 52 MMHG | SYSTOLIC BLOOD PRESSURE: 119 MMHG

## 2019-09-05 VITALS — DIASTOLIC BLOOD PRESSURE: 47 MMHG | SYSTOLIC BLOOD PRESSURE: 99 MMHG

## 2019-09-05 VITALS — SYSTOLIC BLOOD PRESSURE: 106 MMHG | DIASTOLIC BLOOD PRESSURE: 54 MMHG

## 2019-09-05 VITALS — SYSTOLIC BLOOD PRESSURE: 117 MMHG | DIASTOLIC BLOOD PRESSURE: 55 MMHG

## 2019-09-05 VITALS — SYSTOLIC BLOOD PRESSURE: 104 MMHG | DIASTOLIC BLOOD PRESSURE: 47 MMHG

## 2019-09-05 VITALS — SYSTOLIC BLOOD PRESSURE: 107 MMHG | DIASTOLIC BLOOD PRESSURE: 51 MMHG

## 2019-09-05 VITALS — DIASTOLIC BLOOD PRESSURE: 48 MMHG | SYSTOLIC BLOOD PRESSURE: 110 MMHG

## 2019-09-05 VITALS — DIASTOLIC BLOOD PRESSURE: 51 MMHG | SYSTOLIC BLOOD PRESSURE: 106 MMHG

## 2019-09-05 VITALS — DIASTOLIC BLOOD PRESSURE: 58 MMHG | SYSTOLIC BLOOD PRESSURE: 116 MMHG

## 2019-09-05 VITALS — SYSTOLIC BLOOD PRESSURE: 123 MMHG | DIASTOLIC BLOOD PRESSURE: 57 MMHG

## 2019-09-05 LAB
ALBUMIN SERPL-MCNC: 2.6 G/DL (ref 3.4–5)
ALT SERPL-CCNC: 24 U/L (ref 30–65)
ANION GAP SERPL CALC-SCNC: 9 MMOL/L (ref 7–16)
AST SERPL-CCNC: 170 U/L (ref 15–37)
BASOPHILS NFR BLD AUTO: 0 % (ref 0–2)
BE(VIVO): -2.3 MMOL/L
BILIRUB SERPL-MCNC: 0.3 MG/DL
BUN SERPL-MCNC: 40 MG/DL (ref 7–18)
CALCIUM SERPL-MCNC: 8.3 MG/DL (ref 8.5–10.1)
CHLORIDE SERPL-SCNC: 106 MMOL/L (ref 98–107)
CO2 SERPL-SCNC: 26 MMOL/L (ref 21–32)
CREAT SERPL-MCNC: 3.6 MG/DL (ref 0.7–1.3)
EOSINOPHIL NFR BLD: 8 % (ref 0–3)
ERYTHROCYTE [DISTWIDTH] IN BLOOD BY AUTOMATED COUNT: 17.9 % (ref 10.5–14.5)
GLUCOSE SERPL-MCNC: 122 MG/DL (ref 74–106)
GRANULOCYTES NFR BLD MANUAL: 63 % (ref 36–66)
HCO3 BLD-SCNC: 21.9 MMOL/L (ref 22–26)
HCT VFR BLD CALC: 27.1 % (ref 42–52)
HGB BLD-MCNC: 8.8 GM/DL (ref 14–18)
LYMPHOCYTES NFR BLD AUTO: 12 % (ref 24–44)
MCH RBC QN AUTO: 28.9 PG (ref 26–34)
MCHC RBC AUTO-ENTMCNC: 32.4 G/DL (ref 28–37)
MCV RBC: 89.3 FL (ref 80–100)
MONOCYTES NFR BLD: 15 % (ref 1–8)
NEUTROPHILS # BLD: 3.6 THOU/UL (ref 1.4–8.2)
NEUTS BAND NFR BLD: 2 % (ref 0–8)
PCO2 BLD: 35.1 MMHG (ref 35–45)
PHOSPHATE SERPL-MCNC: 2.3 MG/DL (ref 2.5–4.9)
PLATELET # BLD EST: NORMAL 10*3/UL
PLATELET # BLD: 201 THOU/UL (ref 150–400)
PO2 BLD: 73.6 MMHG (ref 80–100)
POTASSIUM SERPL-SCNC: 4.2 MMOL/L (ref 3.5–5.1)
PROT SERPL-MCNC: 7.2 G/DL (ref 6.4–8.2)
RBC # BLD AUTO: 3.04 MIL/UL (ref 4.5–6)
RBC MORPH BLD: NORMAL
SODIUM SERPL-SCNC: 141 MMOL/L (ref 136–145)
WBC # BLD AUTO: 5.5 THOU/UL (ref 4–11)

## 2019-09-05 NOTE — NUR
END OF SHIFT NOTE.
ASSUMED CARE FOR PATIENT AT APPROX 1900 ON 9/4. UPON ARRIVAL PATIENT WAS
ASSESSED AND VITALS TAKEN PER ICU PROTOCOL. PATIENT WAS ON 2L NC SATING > 93%.
PARTIAL BED BATH WAS GIVEN. TURNS WERE OFFERED Q2H, MOST OF THE TIME THE
PATIENT REFUSED. TUBE FEEDING REMAINS AT 50 ML/HR, PATIENT TOLERATING THE RATE
WITH RESIDUALS < 50. PATIENT WAS AFEBRILE, WITH NO SIGNIFICANT EVENTS
OVERNIGHT.

## 2019-09-05 NOTE — NUR
ASSUMED CARE THIS AM, AWAKE AND ORIENTED X 4.  NO COMPLAINTS OF PAIN OR
NAUSEA.  SR/ST ON MONITOR. AFEBRILE.
DRESSING CHANGE DONE AT 1100, BLEEDING WHEN DRESSING REMOVED BUT LOOKS
APPROPRIATE. NEW W TO D DRESSING PLACED.  MEDICATED WITH HYDROCODONE PRIOR TO
TURNING.
POSSIBLE TRANSFER TO [facility] BUT NOT UNTIL DR. CARTER SPEAKS TO WIFE.
 TRIED TO MEET WITH HER THIS AFTERNOON BUT SHE COULD NOT BE AVAILABLE. HER
PHONE NUMBER GIVEN TO DR. CARTER' OFFICE.

## 2019-09-05 NOTE — NUR
FOLLOWING FOR DC PLANNING. CLINICAL INFO REVIEWED. CAME TO MEET SPOUSE AT
1340. PT SLEEPING.  CAIN RAPP INDICATED SPOUSE HERE AROUND NOON AND DIONTE
INFORMED HER DR. MOMIN WOULD BE HERE AT 1 TO MEET WITH HER. SPOUSE
STATED SHE COULD NOT STAY AND LEFT PRIOR TO DR. MOMIN BEING HERE. DR. MOMIN TO CALL SPOUSE TO DISCUSS CARE DECISIONS. WAITING FOR RETURN CALL
FROM DR. BARRERA R/T WHETHER DISCHARGING TO [facility] TODAY AS KAREY ROSA WAS
OBTAINED FOR LTAC TRANSITION. VM LEFT FOR SPOUSE R/T POSSIBLE DC TODAY (I ALSO
SPOKE WITH HER YESTERDAY ABOUT POSSIBLE DC TO [facility] TODAY). RN UPDATED.
CHELSEY THORNTON UPDATED AND CM DIRECTOR UPDATED.

## 2019-09-06 VITALS — SYSTOLIC BLOOD PRESSURE: 125 MMHG | DIASTOLIC BLOOD PRESSURE: 63 MMHG

## 2019-09-06 VITALS — DIASTOLIC BLOOD PRESSURE: 68 MMHG | SYSTOLIC BLOOD PRESSURE: 131 MMHG

## 2019-09-06 VITALS — SYSTOLIC BLOOD PRESSURE: 119 MMHG | DIASTOLIC BLOOD PRESSURE: 63 MMHG

## 2019-09-06 VITALS — SYSTOLIC BLOOD PRESSURE: 110 MMHG | DIASTOLIC BLOOD PRESSURE: 51 MMHG

## 2019-09-06 VITALS — DIASTOLIC BLOOD PRESSURE: 61 MMHG | SYSTOLIC BLOOD PRESSURE: 113 MMHG

## 2019-09-06 VITALS — DIASTOLIC BLOOD PRESSURE: 50 MMHG | SYSTOLIC BLOOD PRESSURE: 98 MMHG

## 2019-09-06 VITALS — DIASTOLIC BLOOD PRESSURE: 51 MMHG | SYSTOLIC BLOOD PRESSURE: 111 MMHG

## 2019-09-06 VITALS — DIASTOLIC BLOOD PRESSURE: 56 MMHG | SYSTOLIC BLOOD PRESSURE: 115 MMHG

## 2019-09-06 VITALS — SYSTOLIC BLOOD PRESSURE: 110 MMHG | DIASTOLIC BLOOD PRESSURE: 52 MMHG

## 2019-09-06 VITALS — DIASTOLIC BLOOD PRESSURE: 55 MMHG | SYSTOLIC BLOOD PRESSURE: 108 MMHG

## 2019-09-06 VITALS — DIASTOLIC BLOOD PRESSURE: 56 MMHG | SYSTOLIC BLOOD PRESSURE: 106 MMHG

## 2019-09-06 VITALS — DIASTOLIC BLOOD PRESSURE: 53 MMHG | SYSTOLIC BLOOD PRESSURE: 111 MMHG

## 2019-09-06 VITALS — SYSTOLIC BLOOD PRESSURE: 125 MMHG | DIASTOLIC BLOOD PRESSURE: 65 MMHG

## 2019-09-06 VITALS — DIASTOLIC BLOOD PRESSURE: 58 MMHG | SYSTOLIC BLOOD PRESSURE: 122 MMHG

## 2019-09-06 VITALS — DIASTOLIC BLOOD PRESSURE: 52 MMHG | SYSTOLIC BLOOD PRESSURE: 110 MMHG

## 2019-09-06 VITALS — SYSTOLIC BLOOD PRESSURE: 125 MMHG | DIASTOLIC BLOOD PRESSURE: 59 MMHG

## 2019-09-06 VITALS — SYSTOLIC BLOOD PRESSURE: 124 MMHG | DIASTOLIC BLOOD PRESSURE: 59 MMHG

## 2019-09-06 VITALS — SYSTOLIC BLOOD PRESSURE: 104 MMHG | DIASTOLIC BLOOD PRESSURE: 50 MMHG

## 2019-09-06 VITALS — SYSTOLIC BLOOD PRESSURE: 101 MMHG | DIASTOLIC BLOOD PRESSURE: 53 MMHG

## 2019-09-06 VITALS — DIASTOLIC BLOOD PRESSURE: 59 MMHG | SYSTOLIC BLOOD PRESSURE: 124 MMHG

## 2019-09-06 VITALS — SYSTOLIC BLOOD PRESSURE: 103 MMHG | DIASTOLIC BLOOD PRESSURE: 50 MMHG

## 2019-09-06 VITALS — SYSTOLIC BLOOD PRESSURE: 124 MMHG | DIASTOLIC BLOOD PRESSURE: 55 MMHG

## 2019-09-06 VITALS — SYSTOLIC BLOOD PRESSURE: 118 MMHG | DIASTOLIC BLOOD PRESSURE: 59 MMHG

## 2019-09-06 VITALS — SYSTOLIC BLOOD PRESSURE: 124 MMHG | DIASTOLIC BLOOD PRESSURE: 58 MMHG

## 2019-09-06 VITALS — DIASTOLIC BLOOD PRESSURE: 50 MMHG | SYSTOLIC BLOOD PRESSURE: 122 MMHG

## 2019-09-06 VITALS — DIASTOLIC BLOOD PRESSURE: 57 MMHG | SYSTOLIC BLOOD PRESSURE: 115 MMHG

## 2019-09-06 VITALS — SYSTOLIC BLOOD PRESSURE: 106 MMHG | DIASTOLIC BLOOD PRESSURE: 58 MMHG

## 2019-09-06 VITALS — SYSTOLIC BLOOD PRESSURE: 125 MMHG | DIASTOLIC BLOOD PRESSURE: 64 MMHG

## 2019-09-06 VITALS — SYSTOLIC BLOOD PRESSURE: 111 MMHG | DIASTOLIC BLOOD PRESSURE: 51 MMHG

## 2019-09-06 VITALS — DIASTOLIC BLOOD PRESSURE: 51 MMHG | SYSTOLIC BLOOD PRESSURE: 115 MMHG

## 2019-09-06 VITALS — DIASTOLIC BLOOD PRESSURE: 53 MMHG | SYSTOLIC BLOOD PRESSURE: 102 MMHG

## 2019-09-06 VITALS — DIASTOLIC BLOOD PRESSURE: 55 MMHG | SYSTOLIC BLOOD PRESSURE: 117 MMHG

## 2019-09-06 LAB
ALBUMIN SERPL-MCNC: 2.5 G/DL (ref 3.4–5)
ANION GAP SERPL CALC-SCNC: 8 MMOL/L (ref 7–16)
BF MACROPHAGE: 85
BF NEUTROPHILS: 11
BUN SERPL-MCNC: 47 MG/DL (ref 7–18)
CALCIUM SERPL-MCNC: 8.2 MG/DL (ref 8.5–10.1)
CHLORIDE SERPL-SCNC: 107 MMOL/L (ref 98–107)
CO2 SERPL-SCNC: 26 MMOL/L (ref 21–32)
CREAT SERPL-MCNC: 3.5 MG/DL (ref 0.7–1.3)
GLUCOSE SERPL-MCNC: 94 MG/DL (ref 74–106)
HCT VFR BLD CALC: 27.2 % (ref 42–52)
HGB BLD-MCNC: 8.9 GM/DL (ref 14–18)
PHOSPHATE SERPL-MCNC: 2.6 MG/DL (ref 2.5–4.9)
POTASSIUM SERPL-SCNC: 4.6 MMOL/L (ref 3.5–5.1)
SODIUM SERPL-SCNC: 141 MMOL/L (ref 136–145)

## 2019-09-06 NOTE — NUR
ambulance form filled out and copy on chart. notified bedside nurse, showed
her was on outside of chart. will need to be faxed at time of dc, to 
hospice house.

## 2019-09-06 NOTE — NUR
END OF SHIFT NOTE.
ASSUMED CARE FOR PATIENT AT APPROX 1900 ON 9/5. UPON ARRIVAL PATIENT WAS
ASSESSED AND VITALS TAKEN PER ICU PROTCOL. PATIENT TUBE FEEDING RATE WAS
INCREASED FROM 50 TO 60 ML/HR PER DR. BARRERA. AT 0000 PATIENTS GASTRIC RESIDUAL
, TUBE FEEDINGS WERE HELD FOR SEVERAL HOURS AND STARTED BACK UP.
PATIENT DID NOT WISH TO BE TURNED EVERY 2 HOURS. PATIENT STILL A7O X4, AND
REMAINS ON 2L NC. BED ALARM ON. NO SIGNIFICANT EVENTS OVERNIGHT.

## 2019-09-06 NOTE — NUR
PT IS DNR. PREVIOUS PLANS FOR LTAC AT [facility] ON HOLD. DR. BARRERA AND
MERRILL SPOKE WITH SPOUSE BY PHONE AND ARE RECOMMNEDING HOSPICE CARE FOR
PT. DR. BARRERA INDICATED SPOUSE WANTS REFERRAL TO  HOSPICE FOR HOUSE RHONDA.
SPOKE WITH MRS WAITE BY PHONE AROUND NOON AND SHE CONFIRMS THIS PLAN.
REFERRAL FAXED TO  HOSPICE AND CAIN CHAUDHARY HERE TO EVAL AROUND 1330. [facility] CAN ACCEPT PT BUT WITHOUT BED TODAY.  PT STATED
HE IS UNSURE OF WHAT HE WANTS AND TO SPEAK WITH HIS SPOUSE. SPOUSE INDICATES
SHE WILL BE AT HOSPITAL TOMORROW TO SPEAK WITH PT AND MAKE FINAL DECISION.
OUTSIDE HOSPITAL DNR FORM ON CHART AND NEEDS SIGNATURE FROM EITHER PT OR
SPOUSE AND DR. BARRERA. DR. BARRERA UPDATED TO SITUATION AND DNR FORM. AMBULANCE
FORM COMPLETED IN CASE AND ON CHART. NATANAEL HUANG UPDATED TO SITUATION AND IS
AVAILABLE ON CALL SAT/SUN TO ASSIST WITH DC IF  HOSPICE HAS A BED AND
PT/SPOUSE AGREE.

## 2019-09-06 NOTE — NUR
[facility] NURSE CAME TO ASSESS PATIENT FOR IN-PATIENT CARE.  SPOKE
TO PATIENT ONE ON ONE AND SPOKE TO THE WIFE ON THE PHONE AT LENGTH.  WIFE
WANTS TO TALK TO CHILDREN AND PATIENT BEFORE MAKING FINAL DECISION.

## 2019-09-07 VITALS — SYSTOLIC BLOOD PRESSURE: 117 MMHG | DIASTOLIC BLOOD PRESSURE: 47 MMHG

## 2019-09-07 VITALS — DIASTOLIC BLOOD PRESSURE: 44 MMHG | SYSTOLIC BLOOD PRESSURE: 116 MMHG

## 2019-09-07 VITALS — SYSTOLIC BLOOD PRESSURE: 113 MMHG | DIASTOLIC BLOOD PRESSURE: 49 MMHG

## 2019-09-07 VITALS — DIASTOLIC BLOOD PRESSURE: 58 MMHG | SYSTOLIC BLOOD PRESSURE: 137 MMHG

## 2019-09-07 VITALS — SYSTOLIC BLOOD PRESSURE: 118 MMHG | DIASTOLIC BLOOD PRESSURE: 42 MMHG

## 2019-09-07 NOTE — NUR
VSS NSR TO ST , LUNGS CLEAR AND DIMINISHED,O2 SAT 2L IS  96%. PT BACK ON
TF THRU G TUBE TWOCAL HN AT 600CC/HR, TOLERATING TUBE FEEDINGS. WATER FLUSHES
150CC EVERY 6 HOURS. PT STARTED ON REGULAR DIET FOR SUPPER TONIGHT. ATE 25%
FED BY WIFE. COLOSTOMY INTACT AND DRAINING 50 CC LIQUID BROWN STOOL. DRESSING
CHANGES TO COCCYX TODAY, SEE WOUND CARE DOCUMENTS. WILL CONTINUE TO MONITER
AND CARE FOR PT PER PLANOF CARE

## 2019-09-07 NOTE — NUR
ASSUMED CARE OF PT. AT 1900. VSS. DENIES PAIN AT THIS TIME. REFUSEING TO TURN.
ST TO NSR ON MONITOR. TEMP MAX OF 99.3. PT. IS ALERT AND ORIENTED X4, PLEASANT
AND CALM. TOLERATING TUBE FEEDING. SEE WOUND DOCUMENTATION. PT. TRANSFERRED TO
CCU ROOM 214. REPORT GIVEN TO SUNNI BARLOW. ALL BELONGINGS WITH PT.

## 2019-09-07 NOTE — NUR
ASSUME CARE 2330 ROM ICU STAFF. PT STABLE ON ARIVAL. A/O X 4/ DROWSY BUT
EASILY AROUSABLE. UNCOOPERATIVE AT TIMES WITH CARE. REFUSES TO BE TURNED BUT
TAKES MEDS OK. SACRAL DRESSING CDI. POOR URINE OUTPUT NOTED FROM ALDRICH.
COLOSTOMY PINK WITH LIQUID GREENISH STOOL NOTED. ASSESSMENT AS CHARTED.
PROGRESSING SLOWLY TO PLAN OF CARE. WILL CONTINUE TO MONITOR AND FOLLOW WIHT
POC.

## 2019-09-08 VITALS — SYSTOLIC BLOOD PRESSURE: 128 MMHG | DIASTOLIC BLOOD PRESSURE: 52 MMHG

## 2019-09-08 VITALS — DIASTOLIC BLOOD PRESSURE: 52 MMHG | SYSTOLIC BLOOD PRESSURE: 128 MMHG

## 2019-09-08 VITALS — SYSTOLIC BLOOD PRESSURE: 111 MMHG | DIASTOLIC BLOOD PRESSURE: 44 MMHG

## 2019-09-08 VITALS — SYSTOLIC BLOOD PRESSURE: 107 MMHG | DIASTOLIC BLOOD PRESSURE: 43 MMHG

## 2019-09-08 VITALS — DIASTOLIC BLOOD PRESSURE: 54 MMHG | SYSTOLIC BLOOD PRESSURE: 125 MMHG

## 2019-09-08 VITALS — DIASTOLIC BLOOD PRESSURE: 61 MMHG | SYSTOLIC BLOOD PRESSURE: 91 MMHG

## 2019-09-08 LAB
ALBUMIN SERPL-MCNC: 2.3 G/DL (ref 3.4–5)
ALT SERPL-CCNC: 59 U/L (ref 30–65)
ANION GAP SERPL CALC-SCNC: 8 MMOL/L (ref 7–16)
AST SERPL-CCNC: 171 U/L (ref 15–37)
BILIRUB SERPL-MCNC: 0.3 MG/DL
BUN SERPL-MCNC: 62 MG/DL (ref 7–18)
CALCIUM SERPL-MCNC: 8.8 MG/DL (ref 8.5–10.1)
CHLORIDE SERPL-SCNC: 106 MMOL/L (ref 98–107)
CO2 SERPL-SCNC: 25 MMOL/L (ref 21–32)
CREAT SERPL-MCNC: 3.7 MG/DL (ref 0.7–1.3)
ERYTHROCYTE [DISTWIDTH] IN BLOOD BY AUTOMATED COUNT: 17.4 % (ref 10.5–14.5)
GLUCOSE SERPL-MCNC: 176 MG/DL (ref 74–106)
HCT VFR BLD CALC: 26.4 % (ref 42–52)
HGB BLD-MCNC: 8.7 GM/DL (ref 14–18)
MCH RBC QN AUTO: 29.4 PG (ref 26–34)
MCHC RBC AUTO-ENTMCNC: 32.8 G/DL (ref 28–37)
MCV RBC: 89.7 FL (ref 80–100)
PHOSPHATE SERPL-MCNC: 2.1 MG/DL (ref 2.5–4.9)
PLATELET # BLD: 269 THOU/UL (ref 150–400)
POTASSIUM SERPL-SCNC: 5.7 MMOL/L (ref 3.5–5.1)
PROT SERPL-MCNC: 7.5 G/DL (ref 6.4–8.2)
RBC # BLD AUTO: 2.95 MIL/UL (ref 4.5–6)
SODIUM SERPL-SCNC: 139 MMOL/L (ref 136–145)
WBC # BLD AUTO: 5.4 THOU/UL (ref 4–11)

## 2019-09-08 NOTE — NUR
VSS REMAINS NSR, LUNGS DIMINISHED AND CLEAR, O2 SAT 2L I2 91-97%. EATING
25% MEALS TODAY. TF INFUSING AT 60CC/HR WITHOUT DIFFICULTY WITH H2O BOLUSUS.
COLOSTOMY WITH BROWN LIQUID DRAINAGE. SEE WOUNF DOCUMENTATION FOR DRESSING
CHANGES. WILL CONTINUE TO MONITER AND CARE FOR PT PER PLANOF CARE

## 2019-09-08 NOTE — NUR
ASSUME CARE 1900. PT/VITALS STABLE. COMPLAINS OF CONSISTENT GENERALIZED ACHE.
ON CONTINUOIUS TUBE FEEDBG THROUGH PEG DUE TO POOR APPETITE. A/O X 4.
PROGRESSING SLOWLY WITH POC. ASSESSMENT AS CHARTED. REFUSES TURN EVEN AFTER
EDUCATION ON THE IMPORTANCE OF TURNING FOR WOUND HEALING. PLAN IS TO CONTINUE
WITH IV AD ORAL ABX/ CONTINUE TO MONITOR INFECTION AND NURITIONAL LEVEL. WILL
CONTINUE TO FOLLOW WIHT POC.

## 2019-09-09 VITALS — SYSTOLIC BLOOD PRESSURE: 128 MMHG | DIASTOLIC BLOOD PRESSURE: 52 MMHG

## 2019-09-09 VITALS — SYSTOLIC BLOOD PRESSURE: 124 MMHG | DIASTOLIC BLOOD PRESSURE: 54 MMHG

## 2019-09-09 VITALS — SYSTOLIC BLOOD PRESSURE: 142 MMHG | DIASTOLIC BLOOD PRESSURE: 54 MMHG

## 2019-09-09 VITALS — DIASTOLIC BLOOD PRESSURE: 54 MMHG | SYSTOLIC BLOOD PRESSURE: 115 MMHG

## 2019-09-09 LAB
ANION GAP SERPL CALC-SCNC: 7 MMOL/L (ref 7–16)
BUN SERPL-MCNC: 68 MG/DL (ref 7–18)
CALCIUM SERPL-MCNC: 8.9 MG/DL (ref 8.5–10.1)
CHLORIDE SERPL-SCNC: 106 MMOL/L (ref 98–107)
CO2 SERPL-SCNC: 26 MMOL/L (ref 21–32)
CREAT SERPL-MCNC: 3.7 MG/DL (ref 0.7–1.3)
GLUCOSE SERPL-MCNC: 170 MG/DL (ref 74–106)
POTASSIUM SERPL-SCNC: 6.4 MMOL/L (ref 3.5–5.1)
SODIUM SERPL-SCNC: 139 MMOL/L (ref 136–145)

## 2019-09-09 NOTE — NUR
Followup: EMR reviewed and noting poor prognosis.  Awaiting further family
decision.  Will defer further nutrition reassessment unless consulted.

## 2019-09-09 NOTE — NUR
FOLLOWING FOR DC PLANNING. MET WITH ROYA WETZEL AND DR. BARRERA PRIOR TO DR. BARRERA MEETING WITH PT AND SPOUSE IN ROOM AT NOON. OUTSIDE HOSPITAL DNR FORM
SIGNED BY PT'S SPOUSE BUT PER DR. BARRERA, AT PRESENT, PT AND SPOUSE ARE NOT
ABLE TO MAKE DECISION ABOUT HOSPICE CARE. PT WAS ACCEPTED BY  HOSPICE
9/6/19 FOR HOSPICE HOUSE. DR. BARRERA CONSULTED DR. HERNANDEZ AND PROVIDED DR. HERNANDEZ
WITH UPDATED DETAILS OF HOSPITALIZATION AND DC PLANNING. DR. HERNANDEZ WILL SEE PT
TODAY.

## 2019-09-09 NOTE — NUR
PAIN WELL CONTROLLED WITH HYDROCODONE.ALERT WITH
FORGETFULNESS.REPOSITIONED.TWOCAL HN IS INFUSING AT 60 ML/HR WITH WATER
FLUSHES.RESIDUAL IS LESS THAN 10 CC.MONITOR SHOWS SINUS TACHY.ALDRICH TO
DD.COLOSTOMY INTACT.CRITICAL POTASSIUM OF 6.4 THIS MORNING AND WAS CALLED AND
STILL WAITING FOR CALL BACK.WILL MONITOR AND CONTINUE POC.

## 2019-09-09 NOTE — NUR
DR. GARCIARP INSTRUCT NIGHT RN TO PUT G-TUBE TO INDEPENDENT DRAINAGE AND KEEP PT
COMFORTABLE WITH MORPHINE.

## 2019-09-10 VITALS — DIASTOLIC BLOOD PRESSURE: 54 MMHG | SYSTOLIC BLOOD PRESSURE: 123 MMHG

## 2019-09-10 NOTE — NUR
Received awake on bed. On nothing per orem- pt informed and aware. Pt A+O x
1-2. On O2 at 3lpm via nasal cannula, skin checked behind his ears- intact.
With PEG tube to suction machine- no output noted. With Colostomy bag in
place- stoma pink and moist, output measured and recorded accordingly. With SL
at R subclavian vein- intact and flushing well. With de jesus in place- draining
well, output measured and recorded accordingly. Maintained on isolation due to
Hx of Cdiff- 2019- protocol observed. On blood sugar monitoring- taken
and recorded accordingly. Pt with CBG of 65 pre breakfast- PRN D50 1/2 vial
given as prescribed, rechecked CBG after intervention CB. With swelling
at scrotum- Dr Che aware, furosemide IV given as prescribed. Dr Che
informed by hypoglycemic episode and intervention given this AM.
Discharge orders put in by Dr Che- discharge instructions, prescription and
chart copy made and sent to [facility]- CM informed; transport set up at
11am, to be picked up by [facility].
Pt with pressure wound at sacrum- dressing changed and photo taken, With ?DTI
at L heel- wound care given and photo taken.
Pt fetched by [facility] at 11am, personal belongings taken with patient, report
given to [facility] and Staff Kaur from [facility]. Verified with staff natasha
if pt to transfer with SC IV, de jesus, PEG and colostomy- as per Staff Natasha,
not to remove any of it, pt to come with them, also informed re: wound care.

## 2019-09-10 NOTE — NUR
ASSUMED CARE OF PATIENT AT 1900. VSS, AFEBRILE. ORDERS TO MAKE PATIENT NPO
OBTAINED. PEG TUBE TO DEPENDENT DRAINGE PER DR BARRERA. LATER OBTAINED ORDERS TO
PUT TO LIS. NO OUTPUT. BLOOD SUGAR CHECKED AT MIDNIGHT PER PROTOCOL, TREATED
PER PROTCOL. RESTING COMFORTABLY AT THIS TIME. ANTICIPATING DC TO HOSPICE
HOUSE TODAY.

## 2019-09-11 NOTE — NUR
DR HERNANDEZ REPORTS HE MET WITH PATIENT AND SP WITH WIFE. HE REPORTS HE DOES NOT
FEEL PATIENT CAN MAKE DECISIONS. HE SP WITH WIFE AND SHE WAS AGREEABLE TO
TRANSITION TO  HOSPICE HOUSE. SP WITH INTAKE AT  HOSPICE WHO REPORTS THEY
EVALED FRIDAY THEY DO NOT NEED TO REEVAL. THEY HAVE BED AVAIL. [facility] ARRANGED
FOR 1100. CHART COPIED. ORDERS FAXED TO FACILITY, NOTIFIED PAITENT AND WIFE.
WIFE TO MEET PATIENT AT FACILITY.